# Patient Record
Sex: FEMALE | Race: WHITE | HISPANIC OR LATINO | Employment: FULL TIME | URBAN - NONMETROPOLITAN AREA
[De-identification: names, ages, dates, MRNs, and addresses within clinical notes are randomized per-mention and may not be internally consistent; named-entity substitution may affect disease eponyms.]

---

## 2017-02-22 ENCOUNTER — OFFICE VISIT (OUTPATIENT)
Dept: URGENT CARE | Facility: PHYSICIAN GROUP | Age: 37
End: 2017-02-22
Payer: COMMERCIAL

## 2017-02-22 VITALS
OXYGEN SATURATION: 98 % | DIASTOLIC BLOOD PRESSURE: 86 MMHG | SYSTOLIC BLOOD PRESSURE: 142 MMHG | TEMPERATURE: 97.1 F | HEART RATE: 100 BPM | RESPIRATION RATE: 16 BRPM

## 2017-02-22 DIAGNOSIS — S80.01XA CONTUSION OF RIGHT KNEE, INITIAL ENCOUNTER: ICD-10-CM

## 2017-02-22 DIAGNOSIS — S80.02XA CONTUSION OF LEFT KNEE, INITIAL ENCOUNTER: ICD-10-CM
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
## 2017-02-22 PROCEDURE — 99203 OFFICE O/P NEW LOW 30 MIN: CPT | Performed by: PHYSICIAN ASSISTANT

## 2017-02-22 RX ORDER — IBUPROFEN 600 MG/1
600 TABLET ORAL EVERY 6 HOURS PRN
Qty: 30 TAB | Refills: 0 | Status: ON HOLD | OUTPATIENT
Start: 2017-02-22 | End: 2017-12-10

## 2017-02-22 RX ORDER — CYCLOBENZAPRINE HCL 10 MG
10 TABLET ORAL 3 TIMES DAILY PRN
Qty: 30 TAB | Refills: 0 | Status: ON HOLD | OUTPATIENT
Start: 2017-02-22 | End: 2017-12-10
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
## 2017-02-22 NOTE — Clinical Note
February 22, 2017         Patient: Bernice Billings   YOB: 1980   Date of Visit: 2/22/2017           To Whom it May Concern:    Bernice Billings was seen in my clinic on 2/22/2017. She may return to work on 2/24/17.    If you have any questions or concerns, please don't hesitate to call.        Sincerely,           Noelle Hazel PA-C  Electronically Signed

## 2017-11-03 ENCOUNTER — NON-PROVIDER VISIT (OUTPATIENT)
Dept: HEALTH INFORMATION MANAGEMENT | Facility: MEDICAL CENTER | Age: 37
End: 2017-11-03
Payer: COMMERCIAL

## 2017-11-03 VITALS — WEIGHT: 176.8 LBS | HEIGHT: 62 IN | BODY MASS INDEX: 32.54 KG/M2

## 2017-11-03 DIAGNOSIS — O24.419 GESTATIONAL DIABETES MELLITUS (GDM) IN THIRD TRIMESTER, GESTATIONAL DIABETES METHOD OF CONTROL UNSPECIFIED: ICD-10-CM

## 2017-11-03 PROCEDURE — G0109 DIAB MANAGE TRN IND/GROUP: HCPCS | Performed by: INTERNAL MEDICINE

## 2017-11-03 NOTE — LETTER
November 3, 2017                   Re: Berniec Billings    1980         [id number]       Nate Farrell M.D.  [address]  [place] [address]      Dear :Nate Farrell M.D.    On 11/3/2017, your patient Bernice Billings, received 2 hours of nutrition training from the Diabetes Center at [facility] for management of her gestational diabetes.  Her EDC is Estimated Date of Delivery:  12/24/17.  We taught the following subjects:    Patient provided 1700 calorie meal plan with 3 meals and 3 snacks.   157 grams carbohydrate,   113 grams protein,   70 grams fat  Importance of meal planning in diabetes management during pregnancy  Importance of consistent timing of meals and snacks and agreed upon times  Avoidance of simple carbohydrates  Metabolism of food components relating to pregnancy  Identification of foods in food groups  Patient demonstrates adequate ability to utilize meal planning manual for reference  Plan 3 meals and 3 snacks with 90% accuracy  Review basic principles of eating out  Reviewed precautions with artificial sweeteners  Comments:  Bernice agreed to follow the meal plan and to eat at the times agreed upon.  She will check blood glucose 4 times a day and record the values in her log book.  She will follow up in your office.    Hopefully this will help in your management of her care.  If we can be of further assistance, please feel free to call.    Thank you for the referral.    Sincerely,  Alyx Yañez RD, CDE  Certified Diabetes Educator

## 2017-11-03 NOTE — PROGRESS NOTES
Bernice came to the Gestational Diabetes program.  She states her father has Type 2 Diabetes.  She states did not have Gestational Diabetes with her other pregnancy.  She was supplied an One Touch Verio Flex meter and 20 test strips.  She was able to do a return demonstration without difficulty. She will keep walking and stay well hydrated with water. Her meal plan is scanned into Media and her Doctor Letters with what was taught can be found under the Letters tab.

## 2017-11-03 NOTE — LETTER
November 3, 2017                   Re: Bernice Billings     1980         [id number]       Nate Farrell M.D.  [address]  [place] [address]      Dear :Nate Farrell M.D.    On 11/3/2017, your patient Bernice Billings, received 1 hour of nurse training from the Diabetes Center at [facility] for management of her gestational diabetes.  Her Estimated Date of Delivery:  12/24/17.  We taught the following subjects:    Introduction to gestational diabetes, benefits and responsibilities of patient, physiology of diabetes and the diease process, benefits of blood glucose monitoring and record keeping, medication action and possible side effects, hypoglycemia, sick day management, exercise, stress reduction and travel with diabetes.       Nurse assessment / Education:    Comments:    Edema:no      Weight:Weight: 80.2 kg (176 lb 12.8 oz)         Complaints:no      Pathophysiology of diabetes in pregnancy    Discuss  potential maternal and fetal complications in pregnancy with diabetes.     Importance of blood glucose monitoring   Proper testing technique using a One Touch Verio Flex meter.    At 1:40 pm, the meter read 105, which was 1 hour after eating.  Testing: fasting and one hour after meals,  expected ranges and rationale for strict control.     Ketone test today:no       Recognition and treatment of hypoglycemia.     Insulin taught: No  Insulin briefly dicussed at this time.    Should patient require insulin later in pregnancy, she would need further education.     Reviewed fetal kick counts and other tests to determine fetal well-being  Discuss benefits and risks of exercise in pregnancy  Discuss when to call Doctor  Discuss sick day care  Importance of wearing diabetes identification      Patient/caregiver appeared to understand the content as demonstrated by appropriate questions.     Bernice Billings was encouraged to discuss this further with you.    Hopefully this will help in your  management of her care.  If we can be of further assistance, please feel free to call.    Thank you for the referral.    Sincerely,      Tami Balderas RN CDE  GDM CLASS  Certified Diabetes Educator

## 2017-11-23 ENCOUNTER — HOSPITAL ENCOUNTER (OUTPATIENT)
Facility: MEDICAL CENTER | Age: 37
End: 2017-11-23
Attending: OBSTETRICS & GYNECOLOGY | Admitting: OBSTETRICS & GYNECOLOGY
Payer: COMMERCIAL

## 2017-11-23 VITALS — TEMPERATURE: 96.8 F | DIASTOLIC BLOOD PRESSURE: 74 MMHG | SYSTOLIC BLOOD PRESSURE: 121 MMHG | RESPIRATION RATE: 18 BRPM

## 2017-11-23 PROCEDURE — 59025 FETAL NON-STRESS TEST: CPT | Performed by: OBSTETRICS & GYNECOLOGY

## 2017-11-23 NOTE — CONSULTS
DATE OF SERVICE:  2017    This is a 37-year-old  3, para 1, at 35 weeks and 1 day, who presents   to labor and delivery for NST secondary to gestational hypertension.  She has   no clinical signs or symptoms of preeclampsia.  Her blood pressures are   121/74.  Fetal surveillance shows baseline 130s with moderate long-term   variability, accels present, no variables, no decels.  Tocometer shows   occasional irritability and rare contraction.  She will be discharged to home   and follow up with routine care.       ____________________________________     MD MIQUEL KRUGER / MULUGETA    DD:  2017 12:07:37  DT:  2017 12:19:34    D#:  [id number]  Job#:  [id number]

## 2017-11-23 NOTE — PROGRESS NOTES
1045- 36 y/o, , EDC 17, EGA 35.4. Here to room 222 for scheduled NST.  EFM/toco applied, patient denies lof/bleeding, reports positive fm. VSS.     1140- Updates to Dr. Dorsey. MD reviewed FHT. Patient discharged with labor precautions. Educated patient on kick counts. Patient verbalized understanding and ambulated off unit.

## 2017-12-09 ENCOUNTER — HOSPITAL ENCOUNTER (INPATIENT)
Facility: MEDICAL CENTER | Age: 37
LOS: 1 days | End: 2017-12-11
Attending: OBSTETRICS & GYNECOLOGY | Admitting: OBSTETRICS & GYNECOLOGY
Payer: COMMERCIAL

## 2017-12-09 LAB
BASOPHILS # BLD AUTO: 0.4 % (ref 0–1.8)
BASOPHILS # BLD: 0.03 K/UL (ref 0–0.12)
EOSINOPHIL # BLD AUTO: 0.24 K/UL (ref 0–0.51)
EOSINOPHIL NFR BLD: 3.3 % (ref 0–6.9)
ERYTHROCYTE [DISTWIDTH] IN BLOOD BY AUTOMATED COUNT: 39.7 FL (ref 35.9–50)
HCT VFR BLD AUTO: 33.5 % (ref 37–47)
HGB BLD-MCNC: 11.5 G/DL (ref 12–16)
HOLDING TUBE BB 8507: NORMAL
IMM GRANULOCYTES # BLD AUTO: 0.02 K/UL (ref 0–0.11)
IMM GRANULOCYTES NFR BLD AUTO: 0.3 % (ref 0–0.9)
LYMPHOCYTES # BLD AUTO: 1.62 K/UL (ref 1–4.8)
LYMPHOCYTES NFR BLD: 22.4 % (ref 22–41)
MCH RBC QN AUTO: 29.5 PG (ref 27–33)
MCHC RBC AUTO-ENTMCNC: 34.3 G/DL (ref 33.6–35)
MCV RBC AUTO: 85.9 FL (ref 81.4–97.8)
MONOCYTES # BLD AUTO: 0.4 K/UL (ref 0–0.85)
MONOCYTES NFR BLD AUTO: 5.5 % (ref 0–13.4)
NEUTROPHILS # BLD AUTO: 4.91 K/UL (ref 2–7.15)
NEUTROPHILS NFR BLD: 68.1 % (ref 44–72)
NRBC # BLD AUTO: 0 K/UL
NRBC BLD AUTO-RTO: 0 /100 WBC
PLATELET # BLD AUTO: 296 K/UL (ref 164–446)
PMV BLD AUTO: 10.6 FL (ref 9–12.9)
RBC # BLD AUTO: 3.9 M/UL (ref 4.2–5.4)
WBC # BLD AUTO: 7.2 K/UL (ref 4.8–10.8)

## 2017-12-09 PROCEDURE — 700105 HCHG RX REV CODE 258

## 2017-12-09 PROCEDURE — 4A1HX4Z MONITORING OF PRODUCTS OF CONCEPTION, CARDIAC ELECTRICAL ACTIVITY, EXTERNAL APPROACH: ICD-10-PCS | Performed by: OBSTETRICS & GYNECOLOGY

## 2017-12-09 PROCEDURE — 85025 COMPLETE CBC W/AUTO DIFF WBC: CPT

## 2017-12-09 RX ORDER — SODIUM CHLORIDE, SODIUM LACTATE, POTASSIUM CHLORIDE, CALCIUM CHLORIDE 600; 310; 30; 20 MG/100ML; MG/100ML; MG/100ML; MG/100ML
INJECTION, SOLUTION INTRAVENOUS
Status: COMPLETED
Start: 2017-12-09 | End: 2017-12-09

## 2017-12-09 RX ADMIN — SODIUM CHLORIDE, POTASSIUM CHLORIDE, SODIUM LACTATE AND CALCIUM CHLORIDE 1000 ML: 600; 310; 30; 20 INJECTION, SOLUTION INTRAVENOUS at 22:30

## 2017-12-09 ASSESSMENT — PAIN SCALES - GENERAL: PAINLEVEL_OUTOF10: 0

## 2017-12-10 LAB — GLUCOSE BLD-MCNC: 82 MG/DL (ref 65–99)

## 2017-12-10 PROCEDURE — 700111 HCHG RX REV CODE 636 W/ 250 OVERRIDE (IP)

## 2017-12-10 PROCEDURE — 303615 HCHG EPIDURAL/SPINAL ANESTHESIA FOR LABOR

## 2017-12-10 PROCEDURE — 3E033VJ INTRODUCTION OF OTHER HORMONE INTO PERIPHERAL VEIN, PERCUTANEOUS APPROACH: ICD-10-PCS | Performed by: OBSTETRICS & GYNECOLOGY

## 2017-12-10 PROCEDURE — 700112 HCHG RX REV CODE 229

## 2017-12-10 PROCEDURE — 700101 HCHG RX REV CODE 250

## 2017-12-10 PROCEDURE — 90471 IMMUNIZATION ADMIN: CPT

## 2017-12-10 PROCEDURE — 90715 TDAP VACCINE 7 YRS/> IM: CPT

## 2017-12-10 PROCEDURE — 700112 HCHG RX REV CODE 229: Performed by: OBSTETRICS & GYNECOLOGY

## 2017-12-10 PROCEDURE — 36415 COLL VENOUS BLD VENIPUNCTURE: CPT

## 2017-12-10 PROCEDURE — 10907ZC DRAINAGE OF AMNIOTIC FLUID, THERAPEUTIC FROM PRODUCTS OF CONCEPTION, VIA NATURAL OR ARTIFICIAL OPENING: ICD-10-PCS | Performed by: OBSTETRICS & GYNECOLOGY

## 2017-12-10 PROCEDURE — 700111 HCHG RX REV CODE 636 W/ 250 OVERRIDE (IP): Performed by: OBSTETRICS & GYNECOLOGY

## 2017-12-10 PROCEDURE — 700102 HCHG RX REV CODE 250 W/ 637 OVERRIDE(OP): Performed by: OBSTETRICS & GYNECOLOGY

## 2017-12-10 PROCEDURE — 304965 HCHG RECOVERY SERVICES

## 2017-12-10 PROCEDURE — A9270 NON-COVERED ITEM OR SERVICE: HCPCS | Performed by: OBSTETRICS & GYNECOLOGY

## 2017-12-10 PROCEDURE — 770002 HCHG ROOM/CARE - OB PRIVATE (112)

## 2017-12-10 PROCEDURE — 3E0234Z INTRODUCTION OF SERUM, TOXOID AND VACCINE INTO MUSCLE, PERCUTANEOUS APPROACH: ICD-10-PCS | Performed by: OBSTETRICS & GYNECOLOGY

## 2017-12-10 PROCEDURE — 59409 OBSTETRICAL CARE: CPT

## 2017-12-10 PROCEDURE — 82962 GLUCOSE BLOOD TEST: CPT

## 2017-12-10 PROCEDURE — 90732 PPSV23 VACC 2 YRS+ SUBQ/IM: CPT | Performed by: OBSTETRICS & GYNECOLOGY

## 2017-12-10 PROCEDURE — 700105 HCHG RX REV CODE 258: Performed by: OBSTETRICS & GYNECOLOGY

## 2017-12-10 RX ORDER — SODIUM CHLORIDE, SODIUM LACTATE, POTASSIUM CHLORIDE, CALCIUM CHLORIDE 600; 310; 30; 20 MG/100ML; MG/100ML; MG/100ML; MG/100ML
INJECTION, SOLUTION INTRAVENOUS CONTINUOUS
Status: DISPENSED | OUTPATIENT
Start: 2017-12-10 | End: 2017-12-10

## 2017-12-10 RX ORDER — ALUMINA, MAGNESIA, AND SIMETHICONE 2400; 2400; 240 MG/30ML; MG/30ML; MG/30ML
30 SUSPENSION ORAL EVERY 6 HOURS PRN
Status: DISCONTINUED | OUTPATIENT
Start: 2017-12-10 | End: 2017-12-10 | Stop reason: HOSPADM

## 2017-12-10 RX ORDER — MISOPROSTOL 200 UG/1
600 TABLET ORAL
Status: DISCONTINUED | OUTPATIENT
Start: 2017-12-10 | End: 2017-12-11 | Stop reason: HOSPADM

## 2017-12-10 RX ORDER — LORATADINE 10 MG/1
10 TABLET ORAL DAILY
COMMUNITY

## 2017-12-10 RX ORDER — ROPIVACAINE HYDROCHLORIDE 2 MG/ML
INJECTION, SOLUTION EPIDURAL; INFILTRATION; PERINEURAL
Status: COMPLETED
Start: 2017-12-10 | End: 2017-12-10

## 2017-12-10 RX ORDER — VITAMIN A ACETATE, BETA CAROTENE, ASCORBIC ACID, CHOLECALCIFEROL, .ALPHA.-TOCOPHEROL ACETATE, DL-, THIAMINE MONONITRATE, RIBOFLAVIN, NIACINAMIDE, PYRIDOXINE HYDROCHLORIDE, FOLIC ACID, CYANOCOBALAMIN, CALCIUM CARBONATE, FERROUS FUMARATE, ZINC OXIDE, CUPRIC OXIDE 3080; 12; 120; 400; 1; 1.84; 3; 20; 22; 920; 25; 200; 27; 10; 2 [IU]/1; UG/1; MG/1; [IU]/1; MG/1; MG/1; MG/1; MG/1; MG/1; [IU]/1; MG/1; MG/1; MG/1; MG/1; MG/1
1 TABLET, FILM COATED ORAL EVERY MORNING
Status: DISCONTINUED | OUTPATIENT
Start: 2017-12-10 | End: 2017-12-11 | Stop reason: HOSPADM

## 2017-12-10 RX ORDER — ONDANSETRON 4 MG/1
4 TABLET, ORALLY DISINTEGRATING ORAL EVERY 6 HOURS PRN
Status: DISCONTINUED | OUTPATIENT
Start: 2017-12-10 | End: 2017-12-11 | Stop reason: HOSPADM

## 2017-12-10 RX ORDER — IBUPROFEN 600 MG/1
600 TABLET ORAL EVERY 6 HOURS PRN
Status: DISCONTINUED | OUTPATIENT
Start: 2017-12-10 | End: 2017-12-11 | Stop reason: HOSPADM

## 2017-12-10 RX ORDER — DEXTROSE, SODIUM CHLORIDE, SODIUM LACTATE, POTASSIUM CHLORIDE, AND CALCIUM CHLORIDE 5; .6; .31; .03; .02 G/100ML; G/100ML; G/100ML; G/100ML; G/100ML
INJECTION, SOLUTION INTRAVENOUS CONTINUOUS
Status: DISCONTINUED | OUTPATIENT
Start: 2017-12-10 | End: 2017-12-10 | Stop reason: HOSPADM

## 2017-12-10 RX ORDER — CITRIC ACID/SODIUM CITRATE 334-500MG
30 SOLUTION, ORAL ORAL EVERY 6 HOURS PRN
Status: DISCONTINUED | OUTPATIENT
Start: 2017-12-10 | End: 2017-12-10 | Stop reason: HOSPADM

## 2017-12-10 RX ORDER — SODIUM CHLORIDE, SODIUM LACTATE, POTASSIUM CHLORIDE, CALCIUM CHLORIDE 600; 310; 30; 20 MG/100ML; MG/100ML; MG/100ML; MG/100ML
INJECTION, SOLUTION INTRAVENOUS PRN
Status: DISCONTINUED | OUTPATIENT
Start: 2017-12-10 | End: 2017-12-11 | Stop reason: HOSPADM

## 2017-12-10 RX ORDER — OXYCODONE AND ACETAMINOPHEN 10; 325 MG/1; MG/1
1 TABLET ORAL EVERY 4 HOURS PRN
Status: DISCONTINUED | OUTPATIENT
Start: 2017-12-10 | End: 2017-12-11 | Stop reason: HOSPADM

## 2017-12-10 RX ORDER — ONDANSETRON 2 MG/ML
4 INJECTION INTRAMUSCULAR; INTRAVENOUS EVERY 6 HOURS PRN
Status: DISCONTINUED | OUTPATIENT
Start: 2017-12-10 | End: 2017-12-11 | Stop reason: HOSPADM

## 2017-12-10 RX ORDER — DOCUSATE SODIUM 100 MG/1
100 CAPSULE, LIQUID FILLED ORAL 2 TIMES DAILY PRN
Status: DISCONTINUED | OUTPATIENT
Start: 2017-12-10 | End: 2017-12-11 | Stop reason: HOSPADM

## 2017-12-10 RX ORDER — MISOPROSTOL 200 UG/1
600 TABLET ORAL
Status: DISCONTINUED | OUTPATIENT
Start: 2017-12-10 | End: 2017-12-10 | Stop reason: HOSPADM

## 2017-12-10 RX ORDER — OXYCODONE HYDROCHLORIDE AND ACETAMINOPHEN 5; 325 MG/1; MG/1
1 TABLET ORAL EVERY 4 HOURS PRN
Status: DISCONTINUED | OUTPATIENT
Start: 2017-12-10 | End: 2017-12-11 | Stop reason: HOSPADM

## 2017-12-10 RX ADMIN — SODIUM CHLORIDE, POTASSIUM CHLORIDE, SODIUM LACTATE AND CALCIUM CHLORIDE: 600; 310; 30; 20 INJECTION, SOLUTION INTRAVENOUS at 08:45

## 2017-12-10 RX ADMIN — PNEUMOCOCCAL VACCINE POLYVALENT 25 MCG
25; 25; 25; 25; 25; 25; 25; 25; 25; 25; 25; 25; 25; 25; 25; 25; 25; 25; 25; 25; 25; 25; 25 INJECTION, SOLUTION INTRAMUSCULAR; SUBCUTANEOUS at 22:07

## 2017-12-10 RX ADMIN — FENTANYL CITRATE 100 MCG: 50 INJECTION, SOLUTION INTRAMUSCULAR; INTRAVENOUS at 12:51

## 2017-12-10 RX ADMIN — Medication 125 ML/HR: at 17:11

## 2017-12-10 RX ADMIN — IBUPROFEN 600 MG: 600 TABLET, FILM COATED ORAL at 19:28

## 2017-12-10 RX ADMIN — ROPIVACAINE HYDROCHLORIDE 100 ML: 2 INJECTION, SOLUTION EPIDURAL; INFILTRATION at 13:23

## 2017-12-10 RX ADMIN — Medication 1 MILLI-UNITS/MIN: at 08:11

## 2017-12-10 RX ADMIN — DOCUSATE SODIUM 100 MG: 100 CAPSULE ORAL at 19:28

## 2017-12-10 RX ADMIN — FENTANYL CITRATE 100 MCG: 50 INJECTION, SOLUTION INTRAMUSCULAR; INTRAVENOUS at 00:46

## 2017-12-10 RX ADMIN — TETANUS TOXOID, REDUCED DIPHTHERIA TOXOID AND ACELLULAR PERTUSSIS VACCINE, ADSORBED 0.5 ML: 5; 2.5; 8; 8; 2.5 SUSPENSION INTRAMUSCULAR at 22:06

## 2017-12-10 RX ADMIN — OXYCODONE AND ACETAMINOPHEN 1 TABLET: 5; 325 TABLET ORAL at 19:28

## 2017-12-10 RX ADMIN — SODIUM CHLORIDE, POTASSIUM CHLORIDE, SODIUM LACTATE AND CALCIUM CHLORIDE: 600; 310; 30; 20 INJECTION, SOLUTION INTRAVENOUS at 12:41

## 2017-12-10 ASSESSMENT — LIFESTYLE VARIABLES
DO YOU DRINK ALCOHOL: NO
DO YOU DRINK ALCOHOL: NO
EVER_SMOKED: YES
ALCOHOL_USE: NO

## 2017-12-10 ASSESSMENT — PAIN SCALES - GENERAL
PAINLEVEL_OUTOF10: 5
PAINLEVEL_OUTOF10: 0
PAINLEVEL_OUTOF10: 1

## 2017-12-10 NOTE — PROGRESS NOTES
0700- Bedside report received from Desirae RICARDO- poc discussed   07- Dr. Farrell in room, balloon removed sve 4-5/60/-2, ok for pt to eat food this am  0811- pitocin started  1000- pt comfortable feeling uc's states they are not uncomfortable at this time will let me know if she needs anything for pain  1219- Dr. Farrell at bedside AROM, sve 5/80/-2  1312- pt sitting for epidural  1320- epidural placed  1415- pt feeling pressure, sve 7-8/90/-1  1445- pt feeling pressure sve complete  1509-  viable female infant apgars 8/8  1700- pt up to the bathroom no problems  1730- Report given to [place] RICARDO- poc discussed

## 2017-12-10 NOTE — PROGRESS NOTES
Pt presents to L&D for scheduled induction for chronic HTN. Pt has GDM (diet controlled) and a hx of asthma.     2255-Cook's catheter balloon placed by Dr Farrell. 60 mL of sterile water placed in uterine balloon, no fluid in vaginal balloon.   0700-report to Ruth Ann RICARDO.

## 2017-12-10 NOTE — CARE PLAN
Problem: Pain  Goal: Alleviation of Pain or a reduction in pain to the patient's comfort goal  Outcome: PROGRESSING AS EXPECTED  Pt feels occasional pain and discomfort with contractions. Pt's pain has been assessed frequently.     Problem: Risk for Infection, Impaired Wound Healing  Goal: Remain free from signs and symptoms of infection  Outcome: PROGRESSING AS EXPECTED  Pt free from S/S of infection.

## 2017-12-10 NOTE — CARE PLAN
Problem: Infection  Goal: Will remain free from infection    Intervention: Assess signs and symptoms of infection  No s/s of infection at this time      Problem: Pain  Goal: Patient will have relaxed facial expressions and be able to rest between uterine contractions    Intervention: Assess for nonverbal signs of ineffective coping with pain and offer pain medications and/or epidural anesthesia  Pt is comfortable at this time, will let me know if she needs pain meds

## 2017-12-11 VITALS
TEMPERATURE: 98.4 F | HEART RATE: 69 BPM | DIASTOLIC BLOOD PRESSURE: 81 MMHG | HEIGHT: 62 IN | OXYGEN SATURATION: 96 % | SYSTOLIC BLOOD PRESSURE: 118 MMHG | WEIGHT: 170 LBS | RESPIRATION RATE: 18 BRPM | BODY MASS INDEX: 31.28 KG/M2

## 2017-12-11 LAB
ERYTHROCYTE [DISTWIDTH] IN BLOOD BY AUTOMATED COUNT: 39.5 FL (ref 35.9–50)
HCT VFR BLD AUTO: 33.2 % (ref 37–47)
HGB BLD-MCNC: 11.2 G/DL (ref 12–16)
MCH RBC QN AUTO: 28.8 PG (ref 27–33)
MCHC RBC AUTO-ENTMCNC: 33.7 G/DL (ref 33.6–35)
MCV RBC AUTO: 85.3 FL (ref 81.4–97.8)
PLATELET # BLD AUTO: 249 K/UL (ref 164–446)
PMV BLD AUTO: 10.8 FL (ref 9–12.9)
RBC # BLD AUTO: 3.89 M/UL (ref 4.2–5.4)
WBC # BLD AUTO: 9.7 K/UL (ref 4.8–10.8)

## 2017-12-11 PROCEDURE — 36415 COLL VENOUS BLD VENIPUNCTURE: CPT

## 2017-12-11 PROCEDURE — A9270 NON-COVERED ITEM OR SERVICE: HCPCS | Performed by: OBSTETRICS & GYNECOLOGY

## 2017-12-11 PROCEDURE — 85027 COMPLETE CBC AUTOMATED: CPT

## 2017-12-11 PROCEDURE — 700102 HCHG RX REV CODE 250 W/ 637 OVERRIDE(OP): Performed by: OBSTETRICS & GYNECOLOGY

## 2017-12-11 RX ORDER — OXYCODONE HYDROCHLORIDE AND ACETAMINOPHEN 5; 325 MG/1; MG/1
1-2 TABLET ORAL EVERY 6 HOURS PRN
Qty: 10 TAB | Refills: 0 | Status: SHIPPED | OUTPATIENT
Start: 2017-12-11 | End: 2018-02-02

## 2017-12-11 RX ORDER — IBUPROFEN 600 MG/1
600 TABLET ORAL EVERY 6 HOURS PRN
Qty: 30 TAB | Refills: 0 | Status: SHIPPED | OUTPATIENT
Start: 2017-12-11 | End: 2018-02-02

## 2017-12-11 RX ADMIN — IBUPROFEN 600 MG: 600 TABLET, FILM COATED ORAL at 01:54

## 2017-12-11 RX ADMIN — OXYCODONE AND ACETAMINOPHEN 1 TABLET: 5; 325 TABLET ORAL at 01:54

## 2017-12-11 RX ADMIN — OXYCODONE HYDROCHLORIDE AND ACETAMINOPHEN 1 TABLET: 10; 325 TABLET ORAL at 11:18

## 2017-12-11 RX ADMIN — Medication 1 TABLET: at 07:57

## 2017-12-11 ASSESSMENT — PAIN SCALES - GENERAL
PAINLEVEL_OUTOF10: 0
PAINLEVEL_OUTOF10: 3
PAINLEVEL_OUTOF10: 5
PAINLEVEL_OUTOF10: 1
PAINLEVEL_OUTOF10: 7

## 2017-12-11 NOTE — CARE PLAN
Problem: Alteration in comfort related to episiotomy, vaginal repair and/or after birth pains  Goal: Patient is able to ambulate, care for self and infant  Patient ambulating independently.  Is able to care for self and infant.  Demonstrates effective bonding with infant.  Intervention: Obtain patient's pain goal  Patient denies pain this morning.  Intervention: Assess 0-10 pain level with vital signs  Patient's pain level this morning is 0.  Intervention: Administer pain meds as requested by patient and ordered by MD/DO/CNM  Pain medications administered per MAR and MD order.      Problem: Fluid Volume:  Goal: Will maintain balanced intake and output  Patient eating and drinking at least 75% of all meals.    Problem: Pain Management  Goal: Pain level will decrease to patient's comfort goal  PRN pain medications available.  Patient denies pain this morning.

## 2017-12-11 NOTE — PROGRESS NOTES
Received report from MIRELLA Cotto RN. Assessment complete. Pt states pain at acceptable level. Discussed pain management plan with pt. Pt will call for PRN pain meds. Pt educated on the dangers of sleeping with infant. Call light within reach. Pt encouraged to call with needs or concerns.

## 2017-12-11 NOTE — DELIVERY NOTE
DATE OF SERVICE:  12/10/2017    DELIVERY NOTE:  This is a patient of mine who was brought in last night for   induction at 38 weeks secondary to chronic hypertension.  She is also an A1   diabetic with good sugar control.  She was started with a Cook catheter   balloon, this took her from 2-3 cm to 4-5 cm.  It was removed in the morning.    Pitocin was started.  She had rupture of membranes with clear fluid.  She   received Pitocin.  She received an epidural.  She went on to deliver a viable   female infant by spontaneous vaginal delivery over sterile prep.  Apgars equal   to 8 and 8 at 1509 hours.  Only gentle traction was used in aid of delivery   of the infant.  There was nuchal cord x1 which was delivered through.    Placenta then delivered intact, 3-vessel cord shortly thereafter.  There were   no lacerations of the vagina or the cervix.  Estimated blood loss was 375 mL.    There were no complications.       ____________________________________     MD BELLA CORREA / MULUGETA    DD:  12/10/2017 15:23:58  DT:  12/10/2017 16:43:06    D#:  [id number]  Job#:  [id number]    cc: [facility]

## 2017-12-11 NOTE — CONSULTS
Spoke with mom to discuss breastfeeding progress. Mom states she has started pumping due to baby being 2+ weeks early, DC+, and small size. Mom states baby tires quickly at breast but her plan is to offer breast, supplement with formula, and pump each feeding.  Assistance offered at next breastfeeding attempt and prn.  Mom breast fed her first baby successfully.

## 2017-12-11 NOTE — PROGRESS NOTES
Patient arrived via wheelchair with belongings to [address]. Report received from [place] MIRELES RN. Patient oriented to room, call light/skylight & infant security. Assessment done fundus firm, lochia light, and vital signs within defined parameters. IV patent and infusing pitocin at 125 mL/Hr per MD order. Discussed with patient pain medication plan, patient requesting to be medicated as needed, will call for pain medication. Reviewed plan of care with patient, encouraged to call with needs. Hourly rounding implemented, call light within reach.

## 2017-12-11 NOTE — PROGRESS NOTES
S:  No c/o, decreased vaginal bleeding, no s/s infection  O:  VSS, AF  PE:  Gen:  NAD.  Abd: soft, NT/ND, no peritoneal signs, FF and NT.  Ext=  No s/s DVT  A:1.  S/p    P:  Pt wants d/c.  D/c with precautions.  F/u ER with temp>100 degrees, severe pain, nausea/vomiting, vaginal bleeding greater than a period, syncope or dizziness, any concerns.  No driving on narcotic pain meds, pelvic rest for 6 weeks.  F/u appointment for post partum check in 6 weeks.

## 2017-12-11 NOTE — PROGRESS NOTES
Received bedside report from night shift RN. Assumed care of patient. Pt assessed and stable. VSS. Patient reports 0/10 pain at this time. Discussed plan of care for day with patient and received verbal understanding. Call light within reach, bed in low position.  Educated pt re fall precautions and received verbal understanding.  However, pt continues to refuse bed alarms.  Pt is alert, oriented, steady on feet and calls appropriately. Patient sitting up in bed eating breakfast.  Infant in open crib next at foot of mother's bed.  Family at bedside.

## 2017-12-11 NOTE — DISCHARGE INSTRUCTIONS
POSTPARTUM DISCHARGE INSTRUCTIONS FOR MOM    YOB: 1980   Age: 37 y.o.               Admit Date: 2017     Discharge Date: 2017  Attending Doctor:  Nate Farrell M.D.                  Allergies:  Patient has no known allergies.    Discharged to home by car. Discharged via walking, hospital escort: Yes.  Special equipment needed: Not Applicable  Belongings with: Personal  Be sure to schedule a follow-up appointment with your primary care doctor or any specialists as instructed.     Discharge Plan: Follow up in the emergency room with temperature greater than 100 degrees, severe pain, nausea/vomiting, vaginal bleeding greater than a period, syncope or dizziness, signs or symptoms of pre-eclampsia, any concerns.  No driving on narcotic pain meds, pelvic rest for 6 weeks.  Follow up appointment for post partum check in 6 weeks.  Smoking Cessation Offered: Patient Counseled  Pneumococcal Vaccine Given - only chart on this line when given: Given (See MAR)  Influenza Vaccine Indication: Not indicated: Previously immunized this influenza season and > 8 years of age    REASONS TO CALL YOUR OBSTETRICIAN:  1.   Persistent fever or shaking chills (Temperature higher than 100.4)  2.   Heavy bleeding (soaking more than 1 pad per hour); Passing clots  3.   Foul odor from vagina  4.   Mastitis (Breast infection; breast pain, chills, fever, redness)  5.   Urinary pain, burning or frequency  6.   Episiotomy infection  7.   Abdominal incision infection  8.   Severe depression longer than 24 hours    HAND WASHING  · Prior to handling the baby.  · Before breastfeeding or bottle feeding baby.  · After using the bathroom or changing the baby's diaper.    WOUND CARE  Ask your physician for additional care instructions.  In general:    ·  Incision:      · Keep clean and dry.    · Do NOT lift anything heavier than your baby for up to 6 weeks.    · There should not be any opening or pus.      VAGINAL  "CARE  · Nothing inside vagina for 6 weeks: no sexual intercourse, tampons or douching.  · Bleeding may continue for 2-4 weeks.  Amount may vary.    · Call your physician for heavy bleeding which means soaking more than 1 pad per hour    BIRTH CONTROL  · It is possible to become pregnant at any time after delivery and while breastfeeding.  · Plan to discuss a method of birth control with your physician at your follow up visit. visit.    DIET AND ELIMINATION  · Eating more fiber (bran cereal, fruits, and vegetables) and drinking plenty of fluids will help to avoid constipation.  · Urinary frequency after childbirth is normal.    POSTPARTUM BLUES  During the first few days after birth, you may experience a sense of the \"blues\" which may include impatience, irritability or even crying.  These feeling come and go quickly.  However, as many as 1 in 10 women experience emotional symptoms known as postpartum depression.    Postpartum depression:  May start as early as the second or third day after delivery or take several weeks or months to develop.  Symptoms of \"blues\" are present, but are more intense:  Crying spells; loss of appetite; feelings of hopelessness or loss of control; fear of touching the baby; over concern or no concern at all about the baby; little or no concern about your own appearance/caring for yourself; and/or inability to sleep or excessive sleeping.  Contact your physician if you are experiencing any of these symptoms.    Crisis Hotline:  · [place] Crisis Hotline:  6-135-WJPMLEE  Or 1-944.410.4154  · [place] Crisis Hotline:  1-811.170.7136  Or 112-559-0039    PREVENTING SHAKEN BABY:  If you are angry or stressed, PUT THE BABY IN THE CRIB, step away, take some deep breaths, and wait until you are calm to care for the baby.  DO NOT SHAKE THE BABY.  You are not alone, call a supporter for help.    · Crisis Call Center 24/7 crisis line 749-115-8284 or 1-344.860.3494  · You can also text them, text " "\"ANSWER\" to 532671    QUIT SMOKING/TOBACCO USE:  I understand the use of any tobacco products increases my chance of suffering from future heart disease and could cause other illnesses which may shorten my life. Quitting the use of tobacco products is the single most important thing I can do to improve my health. For further information on smoking / tobacco cessation call a Toll Free Quit Line at 1-782.580.2696 (*[employer] [place]) or 1-943.137.6131 (American Lung Association) or you can access the web based program at www.lungusa.org.    · [place] Tobacco Users Help Line:  (296) 724-7080       Toll Free: 1-689.388.4239  · Quit Tobacco Program [facility] (841)244-0415    DEPRESSION / SUICIDE RISK:  As you are discharged from this [facility], it is important to learn how to keep safe from harming yourself.    Recognize the warning signs:  · Abrupt changes in personality, positive or negative- including increase in energy   · Giving away possessions  · Change in eating patterns- significant weight changes-  positive or negative  · Change in sleeping patterns- unable to sleep or sleeping all the time   · Unwillingness or inability to communicate  · Depression  · Unusual sadness, discouragement and loneliness  · Talk of wanting to die  · Neglect of personal appearance   · Rebelliousness- reckless behavior  · Withdrawal from people/activities they love  · Confusion- inability to concentrate     If you or a loved one observes any of these behaviors or has concerns about self-harm, here's what you can do:  · Talk about it- your feelings and reasons for harming yourself  · Remove any means that you might use to hurt yourself (examples: pills, rope, extension cords, firearm)  · Get professional help from the community (Mental Health, Substance Abuse, psychological counseling)  · Do not be alone:Call your Safe Contact- someone whom you trust who will be there for you.  · Call your " local CRISIS HOTLINE 294-9585 or 816-871-0411  · Call your local Children's Mobile Crisis Response Team [place] (336) 519-9359 or www.[employer]  · Call the toll free National Suicide Prevention Hotlines   · National Suicide Prevention Lifeline 678-087-VXTT (0038)  · National Hope Line Network 800-SUICIDE (630-3225)    DISCHARGE SURVEY:  Thank you for choosing [facility].  We hope we provided you with very good care.  You may be receiving a survey in the mail.  Please fill it out.  Your opinion is valuable to us.    ADDITIONAL EDUCATIONAL MATERIALS GIVEN TO PATIENT:  Postpartum education materials.        My signature on this form indicates that:  1.  I have reviewed and understand the above information  2.  My questions regarding this information have been answered to my satisfaction.  3.  I have formulated a plan with my discharge nurse to obtain my prescribed medication for home.

## 2017-12-11 NOTE — PROGRESS NOTES
1730- Report received from NEYMAR Hernandez RN. Epidural catheter removed with tip intact.     1800- Patient up to bathroom and able to void large amount. Patient transferred to PP unit in stable condition and bedside report to PP DAVION Monroe. POC discussed.

## 2017-12-12 NOTE — PROGRESS NOTES
Prescriptions discharge instructions and education for mom and baby given to patient. Follow up appointments discussed.

## 2018-02-02 DIAGNOSIS — Z01.812 PRE-OPERATIVE LABORATORY EXAMINATION: ICD-10-CM

## 2018-02-02 DIAGNOSIS — Z01.810 PRE-OPERATIVE CARDIOVASCULAR EXAMINATION: ICD-10-CM

## 2018-02-02 LAB
APPEARANCE UR: CLEAR
BASOPHILS # BLD AUTO: 0.7 % (ref 0–1.8)
BASOPHILS # BLD: 0.06 K/UL (ref 0–0.12)
BILIRUB UR QL STRIP.AUTO: NEGATIVE
COLOR UR: YELLOW
CULTURE IF INDICATED INDCX: NO UA CULTURE
EOSINOPHIL # BLD AUTO: 0.28 K/UL (ref 0–0.51)
EOSINOPHIL NFR BLD: 3.4 % (ref 0–6.9)
ERYTHROCYTE [DISTWIDTH] IN BLOOD BY AUTOMATED COUNT: 42.9 FL (ref 35.9–50)
GLUCOSE UR STRIP.AUTO-MCNC: NEGATIVE MG/DL
HCG UR QL: NEGATIVE
HCT VFR BLD AUTO: 36.5 % (ref 37–47)
HGB BLD-MCNC: 11.9 G/DL (ref 12–16)
IMM GRANULOCYTES # BLD AUTO: 0.05 K/UL (ref 0–0.11)
IMM GRANULOCYTES NFR BLD AUTO: 0.6 % (ref 0–0.9)
KETONES UR STRIP.AUTO-MCNC: NEGATIVE MG/DL
LEUKOCYTE ESTERASE UR QL STRIP.AUTO: NEGATIVE
LYMPHOCYTES # BLD AUTO: 1.61 K/UL (ref 1–4.8)
LYMPHOCYTES NFR BLD: 19.8 % (ref 22–41)
MCH RBC QN AUTO: 27.8 PG (ref 27–33)
MCHC RBC AUTO-ENTMCNC: 32.6 G/DL (ref 33.6–35)
MCV RBC AUTO: 85.3 FL (ref 81.4–97.8)
MICRO URNS: NORMAL
MONOCYTES # BLD AUTO: 0.36 K/UL (ref 0–0.85)
MONOCYTES NFR BLD AUTO: 4.4 % (ref 0–13.4)
NEUTROPHILS # BLD AUTO: 5.77 K/UL (ref 2–7.15)
NEUTROPHILS NFR BLD: 71.1 % (ref 44–72)
NITRITE UR QL STRIP.AUTO: NEGATIVE
NRBC # BLD AUTO: 0 K/UL
NRBC BLD-RTO: 0 /100 WBC
PH UR STRIP.AUTO: 6 [PH]
PLATELET # BLD AUTO: 406 K/UL (ref 164–446)
PMV BLD AUTO: 9.8 FL (ref 9–12.9)
PROT UR QL STRIP: NEGATIVE MG/DL
RBC # BLD AUTO: 4.28 M/UL (ref 4.2–5.4)
RBC UR QL AUTO: NEGATIVE
SP GR UR REFRACTOMETRY: 1.01
SP GR UR STRIP.AUTO: 1.01
UROBILINOGEN UR STRIP.AUTO-MCNC: 0.2 MG/DL
WBC # BLD AUTO: 8.1 K/UL (ref 4.8–10.8)

## 2018-02-02 PROCEDURE — 36415 COLL VENOUS BLD VENIPUNCTURE: CPT

## 2018-02-02 PROCEDURE — 81003 URINALYSIS AUTO W/O SCOPE: CPT

## 2018-02-02 PROCEDURE — 84703 CHORIONIC GONADOTROPIN ASSAY: CPT

## 2018-02-02 PROCEDURE — 85025 COMPLETE CBC W/AUTO DIFF WBC: CPT

## 2018-02-02 PROCEDURE — 81025 URINE PREGNANCY TEST: CPT

## 2018-02-07 RX ORDER — CELECOXIB 200 MG/1
400 CAPSULE ORAL
Status: DISCONTINUED | OUTPATIENT
Start: 2018-02-08 | End: 2018-02-08 | Stop reason: HOSPADM

## 2018-02-07 RX ORDER — ACETAMINOPHEN 500 MG
1000 TABLET ORAL
Status: DISCONTINUED | OUTPATIENT
Start: 2018-02-08 | End: 2018-02-08 | Stop reason: HOSPADM

## 2018-02-07 RX ORDER — GABAPENTIN 300 MG/1
300 CAPSULE ORAL
Status: DISCONTINUED | OUTPATIENT
Start: 2018-02-08 | End: 2018-02-08 | Stop reason: HOSPADM

## 2018-02-07 NOTE — H&P
She is scheduled for a laparoscopic bilateral salpingectomy tomorrow at   [facility].    CHIEF COMPLAINT:  Desires permanent sterilization.    HISTORY OF PRESENT ILLNESS:  Patient is a 37-year-old,  3, para 2,   female who is status post second vaginal delivery by Dr. Farrell about 7 weeks   ago.  She saw him for a postpartum visit and wanted to proceed with a   sterilization procedure before she returns to work.  She also has had genuine   stress incontinence for over 10 years and was noted to have a rectocele and   cystocele on her postpartum exam, but the recommendation is to defer her TOT   and A and P repair at least a year after childbirth.  Patient is agreeable to   this plan and plan is to proceed only with a laparoscopic bilateral   salpingectomy for sterilization.    PAST MEDICAL HISTORY:  Significant for asthma, gestational diabetes,   hypertension.    PAST SURGICAL HISTORY:  Otherwise negative.    OBSTETRICAL HISTORY:  She has had 2 term vaginal deliveries without   complication.    CURRENT MEDICATIONS:  She has been on labetalol in pregnancy; Pro-Air,   Pulmicort, Singulair for her asthma.    ALLERGIES:  She has no known drug allergies, mostly seasonal.    FAMILY HISTORY:  Significant for diabetes, heart disease, stroke,   hyperlipidemia, hypertension, ovarian cancer possibly.    OBSTETRICAL HISTORY:  She has had 2 term vaginal deliveries without   complication.  Her last Pap was within the last year, within normal limits.    SOCIAL HISTORY:  She is .  Denies any alcohol, tobacco, or IV drug use.    No illicit drug use.    PHYSICAL EXAMINATION:  VITAL SIGNS:  Stable.  She is 5 feet 2 inches, 159 pounds.  GENERAL:  Alert and oriented, in no acute distress.  LUNGS:  Clear.  CORONARY:  Regular rhythm and rate.  ABDOMEN:  Soft, nondistended, nontender without mass or organosplenomegaly.  GENITOURINARY:  External genitalia is normal.  She has a parous cervix.    Anterior and posterior defects  noted.  Minimal prolapse of the uterus and a   Q-tip test positive.    IMPRESSION:  Patient has mild pelvic relaxation and stress incontinence, which   we plan to perform Kegel exercise x4 weeks and wait until she is at least   about a year postpartum before making any surgical recommendation.  Patient   desires permanent sterilization.  She is multiparous and is advanced maternal   age.  She understands the risks of proceeding with laparoscopic bilateral   salpingectomy, which include but are not limited to infection, bleeding,   transfusion, transfusion-related complications, risk of pregnancy, although   remote, risk for taking the entire tube bilaterally, and increased risk of an   ectopic if indeed she were to conceive, although that would not be a tubal   ectopic but an abdominal ectopic or otherwise, risk of injury to bowel,   bladder, need for repair with laparoscopic procedure.  She was also given the   option for Essure coil placement and declined that option.  She understands   the risks of general anesthesia including death.  We discussed nonnarcotic   options as well as her other options for nonpermanent contraception including   LARC method and she declines.  A  check will be performed as well as she   will be given Percocet and fill out a narcotic prescription form as well as a   risk assessment form prior to discharge.  All her questions were answered and   informed consent was obtained.       ____________________________________     MD DIMAS REYES / MULUGETA    DD:  02/07/2018 11:41:38  DT:  02/07/2018 12:29:40    D#:  [id number]  Job#:  [id number]

## 2018-02-08 ENCOUNTER — HOSPITAL ENCOUNTER (OUTPATIENT)
Facility: MEDICAL CENTER | Age: 38
End: 2018-02-08
Attending: OBSTETRICS & GYNECOLOGY | Admitting: OBSTETRICS & GYNECOLOGY
Payer: COMMERCIAL

## 2018-02-08 VITALS
BODY MASS INDEX: 30.43 KG/M2 | DIASTOLIC BLOOD PRESSURE: 81 MMHG | HEART RATE: 70 BPM | OXYGEN SATURATION: 96 % | TEMPERATURE: 97.4 F | WEIGHT: 165.34 LBS | SYSTOLIC BLOOD PRESSURE: 146 MMHG | RESPIRATION RATE: 16 BRPM | HEIGHT: 62 IN

## 2018-02-08 DIAGNOSIS — G89.18 POSTOPERATIVE PAIN: ICD-10-CM

## 2018-02-08 LAB
B-HCG FREE SERPL-ACNC: <5 MIU/ML
IHCGL IHCGL: NEGATIVE MIU/ML

## 2018-02-08 PROCEDURE — 501586 HCHG TROCAR, THRD SPIKE 5X55: Performed by: OBSTETRICS & GYNECOLOGY

## 2018-02-08 PROCEDURE — 160048 HCHG OR STATISTICAL LEVEL 1-5: Performed by: OBSTETRICS & GYNECOLOGY

## 2018-02-08 PROCEDURE — 501399 HCHG SPECIMAN BAG, ENDO CATC: Performed by: OBSTETRICS & GYNECOLOGY

## 2018-02-08 PROCEDURE — 700102 HCHG RX REV CODE 250 W/ 637 OVERRIDE(OP)

## 2018-02-08 PROCEDURE — A4338 INDWELLING CATHETER LATEX: HCPCS | Performed by: OBSTETRICS & GYNECOLOGY

## 2018-02-08 PROCEDURE — 160002 HCHG RECOVERY MINUTES (STAT): Performed by: OBSTETRICS & GYNECOLOGY

## 2018-02-08 PROCEDURE — 500886 HCHG PACK, LAPAROSCOPY: Performed by: OBSTETRICS & GYNECOLOGY

## 2018-02-08 PROCEDURE — 502704 HCHG DEVICE, LIGASURE IMPACT: Performed by: OBSTETRICS & GYNECOLOGY

## 2018-02-08 PROCEDURE — 84702 CHORIONIC GONADOTROPIN TEST: CPT

## 2018-02-08 PROCEDURE — 501582 HCHG TROCAR, THRD BLADED: Performed by: OBSTETRICS & GYNECOLOGY

## 2018-02-08 PROCEDURE — 700111 HCHG RX REV CODE 636 W/ 250 OVERRIDE (IP)

## 2018-02-08 PROCEDURE — 700101 HCHG RX REV CODE 250

## 2018-02-08 PROCEDURE — 160009 HCHG ANES TIME/MIN: Performed by: OBSTETRICS & GYNECOLOGY

## 2018-02-08 PROCEDURE — 88302 TISSUE EXAM BY PATHOLOGIST: CPT

## 2018-02-08 PROCEDURE — A9270 NON-COVERED ITEM OR SERVICE: HCPCS

## 2018-02-08 PROCEDURE — 160041 HCHG SURGERY MINUTES - EA ADDL 1 MIN LEVEL 4: Performed by: OBSTETRICS & GYNECOLOGY

## 2018-02-08 PROCEDURE — 501838 HCHG SUTURE GENERAL: Performed by: OBSTETRICS & GYNECOLOGY

## 2018-02-08 PROCEDURE — 160036 HCHG PACU - EA ADDL 30 MINS PHASE I: Performed by: OBSTETRICS & GYNECOLOGY

## 2018-02-08 PROCEDURE — 160029 HCHG SURGERY MINUTES - 1ST 30 MINS LEVEL 4: Performed by: OBSTETRICS & GYNECOLOGY

## 2018-02-08 PROCEDURE — 160035 HCHG PACU - 1ST 60 MINS PHASE I: Performed by: OBSTETRICS & GYNECOLOGY

## 2018-02-08 PROCEDURE — 500868 HCHG NEEDLE, SURGI(VARES): Performed by: OBSTETRICS & GYNECOLOGY

## 2018-02-08 RX ORDER — HALOPERIDOL 5 MG/ML
INJECTION INTRAMUSCULAR
Status: COMPLETED
Start: 2018-02-08 | End: 2018-02-08

## 2018-02-08 RX ORDER — GABAPENTIN 300 MG/1
CAPSULE ORAL
Status: COMPLETED
Start: 2018-02-08 | End: 2018-02-08

## 2018-02-08 RX ORDER — OXYCODONE HYDROCHLORIDE 5 MG/1
2.5 TABLET ORAL
Status: DISCONTINUED | OUTPATIENT
Start: 2018-02-08 | End: 2018-02-08 | Stop reason: HOSPADM

## 2018-02-08 RX ORDER — BUPIVACAINE HYDROCHLORIDE AND EPINEPHRINE 2.5; 5 MG/ML; UG/ML
INJECTION, SOLUTION EPIDURAL; INFILTRATION; INTRACAUDAL; PERINEURAL
Status: DISCONTINUED | OUTPATIENT
Start: 2018-02-08 | End: 2018-02-08 | Stop reason: HOSPADM

## 2018-02-08 RX ORDER — OXYCODONE HYDROCHLORIDE 5 MG/1
5 TABLET ORAL
Qty: 15 TAB | Refills: 0 | Status: SHIPPED | OUTPATIENT
Start: 2018-02-08 | End: 2018-02-11

## 2018-02-08 RX ORDER — CELECOXIB 200 MG/1
200 CAPSULE ORAL 2 TIMES DAILY WITH MEALS
Status: DISCONTINUED | OUTPATIENT
Start: 2018-02-08 | End: 2018-02-08 | Stop reason: HOSPADM

## 2018-02-08 RX ORDER — ACETAMINOPHEN 500 MG
TABLET ORAL
Status: COMPLETED
Start: 2018-02-08 | End: 2018-02-08

## 2018-02-08 RX ORDER — OXYCODONE HCL 5 MG/5 ML
SOLUTION, ORAL ORAL
Status: COMPLETED
Start: 2018-02-08 | End: 2018-02-08

## 2018-02-08 RX ORDER — SODIUM CHLORIDE, SODIUM LACTATE, POTASSIUM CHLORIDE, CALCIUM CHLORIDE 600; 310; 30; 20 MG/100ML; MG/100ML; MG/100ML; MG/100ML
INJECTION, SOLUTION INTRAVENOUS CONTINUOUS
Status: DISCONTINUED | OUTPATIENT
Start: 2018-02-08 | End: 2018-02-08 | Stop reason: HOSPADM

## 2018-02-08 RX ORDER — ONDANSETRON 2 MG/ML
4 INJECTION INTRAMUSCULAR; INTRAVENOUS EVERY 6 HOURS PRN
Status: DISCONTINUED | OUTPATIENT
Start: 2018-02-08 | End: 2018-02-08 | Stop reason: HOSPADM

## 2018-02-08 RX ORDER — CELECOXIB 200 MG/1
CAPSULE ORAL
Status: COMPLETED
Start: 2018-02-08 | End: 2018-02-08

## 2018-02-08 RX ORDER — ACETAMINOPHEN 500 MG
1000 TABLET ORAL EVERY 6 HOURS
Status: DISCONTINUED | OUTPATIENT
Start: 2018-02-08 | End: 2018-02-08 | Stop reason: HOSPADM

## 2018-02-08 RX ADMIN — SODIUM CHLORIDE, SODIUM LACTATE, POTASSIUM CHLORIDE, CALCIUM CHLORIDE 1000 ML: 600; 310; 30; 20 INJECTION, SOLUTION INTRAVENOUS at 07:30

## 2018-02-08 RX ADMIN — GABAPENTIN 300 MG: 300 CAPSULE ORAL at 07:30

## 2018-02-08 RX ADMIN — ACETAMINOPHEN 1000 MG: 500 TABLET, FILM COATED ORAL at 07:30

## 2018-02-08 RX ADMIN — OXYCODONE HYDROCHLORIDE 10 MG: 5 SOLUTION ORAL at 09:15

## 2018-02-08 RX ADMIN — FENTANYL CITRATE 25 MCG: 50 INJECTION, SOLUTION INTRAMUSCULAR; INTRAVENOUS at 09:15

## 2018-02-08 RX ADMIN — HALOPERIDOL LACTATE 1 MG: 5 INJECTION, SOLUTION INTRAMUSCULAR at 10:27

## 2018-02-08 RX ADMIN — CELECOXIB 400 MG: 200 CAPSULE ORAL at 07:30

## 2018-02-08 RX ADMIN — FENTANYL CITRATE 25 MCG: 50 INJECTION, SOLUTION INTRAMUSCULAR; INTRAVENOUS at 09:35

## 2018-02-08 ASSESSMENT — PAIN SCALES - GENERAL
PAINLEVEL_OUTOF10: 5
PAINLEVEL_OUTOF10: 2
PAINLEVEL_OUTOF10: 0
PAINLEVEL_OUTOF10: 4
PAINLEVEL_OUTOF10: 3
PAINLEVEL_OUTOF10: 4
PAINLEVEL_OUTOF10: 4

## 2018-02-08 NOTE — OR NURSING
0858 Pt. Received from OR, report from Dr. Yoon. Respirations even unlabored. Oral airway in place, pt. Obstructing. Jaw thrust provided. Oxygen in place. No signs of nausea or vomiting at this time.   0906: Oral airway removed, pt waking up. Surgical site visualized one site at umbilicus and one site pericervical both sites covered in a dressing CDI. Laurel-pad in place CDI.   0910: Pt. Tolerating orals without difficulty.   0915: Pt. c/o of pain in pelvis, medicated with IV and oral pain medications.   0935: Pt. Medicated with IV Fentanyl. Family brought to bedside.  1010: Pt. Assisted to restroom, voided without difficulty. Pt. Dressed.   1015: Pt. Up in recliner chair,  at bedside. Discussed discharge instructions with pt. And . They verbalize understanding, all questions answered.    1025: Pt. Having nausea requests medication, medicated with Haldol for nausea.   1042: Pt. Started to desaturate after Haldol. Encouraged deep breathing.   1048: pt. Still desaturating on and off, and pt. Sleepy. Pt. Placed back on 2 liters of oxygen. O2 sat. Improved.   1115: pt. Sleeping. Oxygen removed, will monitor O2 saturation.   1150: Pt. Discharged home, O2 saturations above 90% for over 30 minutes. In stable condition. Pain tolerable and nausea subsided. Escorted out via w/c with belongings.

## 2018-02-08 NOTE — OR SURGEON
Immediate Post OP Note    PreOp Diagnosis: desires permanent sterilization, multiparity, asthma    PostOp Diagnosis: same as above    Procedure(s):  PELVISCOPY - Wound Class: Clean  SALPINGECTOMY - Wound Class: Clean    Surgeon(s):  Alina Cherry M.D.    Anesthesiologist/Type of Anesthesia:  Anesthesiologist: Vincent Bledsoe M.D./General    Surgical Staff:  Circulator: Cami Martinez R.N.; Marti Richardson R.N.  Scrub Person: Karolina Luther    Specimens:  Fallopian tubes    Estimated Blood Loss: less than 10cc    Findings: parous cervix and uterus, nl tubes and ovaries    Complications: none        2/8/2018 8:53 AM Alina Cherry

## 2018-02-08 NOTE — DISCHARGE INSTRUCTIONS
ACTIVITY: Rest and take it easy for the first 24 hours.  A responsible adult is recommended to remain with you during that time.  It is normal to feel sleepy.  We encourage you to not do anything that requires balance, judgment or coordination.    MILD FLU-LIKE SYMPTOMS ARE NORMAL. YOU MAY EXPERIENCE GENERALIZED MUSCLE ACHES, THROAT IRRITATION, HEADACHE AND/OR SOME NAUSEA.    FOR 24 HOURS DO NOT:  Drive, operate machinery or run household appliances.  Drink beer or alcoholic beverages.   Make important decisions or sign legal documents.    SPECIAL INSTRUCTIONS: SEE HANDOUT  Pelvic rest and no heavy lifting more than 20 lb for 6 weeks   Call for heavy bleeding or evidence of wound infection    DIET: To avoid nausea, slowly advance diet as tolerated, avoiding spicy or greasy foods for the first day.  Add more substantial food to your diet according to your physician's instructions.  Babies can be fed formula or breast milk as soon as they are hungry.  INCREASE FLUIDS AND FIBER TO AVOID CONSTIPATION.    SURGICAL DRESSING/BATHING: SEE HANDOUT    FOLLOW-UP APPOINTMENT:  A follow-up appointment should be arranged with your doctor DR. PRATER in 2 weeks.    You should CALL YOUR PHYSICIAN if you develop:  Fever greater than 101 degrees F.  Pain not relieved by medication, or persistent nausea or vomiting.  Excessive bleeding (blood soaking through dressing) or unexpected drainage from the wound.  Extreme redness or swelling around the incision site, drainage of pus or foul smelling drainage.  Inability to urinate or empty your bladder within 8 hours.  Problems with breathing or chest pain.    You should call 911 if you develop problems with breathing or chest pain.  If you are unable to contact your doctor or surgical center, you should go to the nearest emergency room or urgent care center.  Physician's telephone #: [phone]    If any questions arise, call your doctor.  If your doctor is not available, please feel free to  call the Surgical Center at [phone]. The Center is open Monday through Friday from 7AM to 7PM.  You can also call the HEALTH HOTLINE open 24 hours/day, 7 days/week and speak to a nurse at [phone], or toll free at [phone].    A registered nurse may call you a few days after your surgery to see how you are doing after your procedure.    MEDICATIONS: Resume taking daily medication.  Take prescribed pain medication with food.  If no medication is prescribed, you may take non-aspirin pain medication if needed.  PAIN MEDICATION CAN BE VERY CONSTIPATING.  Take a stool softener or laxative such as senokot, pericolace, or milk of magnesia if needed.    Prescription given for OXYCODONE IR.  Last pain medication given at 09:15 AM, CAN TAKE AGAIN AT 1:15 PM.    If your physician has prescribed pain medication that includes Acetaminophen (Tylenol), do not take additional Acetaminophen (Tylenol) while taking the prescribed medication.    Depression / Suicide Risk    As you are discharged from this [facility], it is important to learn how to keep safe from harming yourself.    Recognize the warning signs:  · Abrupt changes in personality, positive or negative- including increase in energy   · Giving away possessions  · Change in eating patterns- significant weight changes-  positive or negative  · Change in sleeping patterns- unable to sleep or sleeping all the time   · Unwillingness or inability to communicate  · Depression  · Unusual sadness, discouragement and loneliness  · Talk of wanting to die  · Neglect of personal appearance   · Rebelliousness- reckless behavior  · Withdrawal from people/activities they love  · Confusion- inability to concentrate     If you or a loved one observes any of these behaviors or has concerns about self-harm, here's what you can do:  · Talk about it- your feelings and reasons for harming yourself  · Remove any means that you might use to hurt yourself (examples:  pills, rope, extension cords, firearm)  · Get professional help from the community (Mental Health, Substance Abuse, psychological counseling)  · Do not be alone:Call your Safe Contact- someone whom you trust who will be there for you.  · Call your local CRISIS HOTLINE 917-0852 or 717-261-9878  · Call your local Children's Mobile Crisis Response Team [place] (358) 515-5524 or www.[employer]  · Call the toll free National Suicide Prevention Hotlines   · National Suicide Prevention Lifeline 060-836-FJNE (6846)  · National Hope Line Network 800-SUICIDE (849-9154)

## 2018-02-08 NOTE — OP REPORT
DATE OF SERVICE:  02/08/2018    PREOPERATIVE DIAGNOSES:  1.  Multiparity.  2.  Desires permanent sterilization.  3.  Asthma.    POSTOPERATIVE DIAGNOSES:  1.  Multiparity.  2.  Desires permanent sterilization.  3.  Asthma.    OPERATIONS/PROCEDURES PERFORMED:  Diagnostic laparoscopy,   laparoscopic-assisted bilateral salpingectomy.    SURGEON:  Alina Cherry MD    ASSISTANT:  None.    ANESTHESIOLOGIST:  Vincent Morrow MD    ANESTHESIA:  General endotracheal.    COMPLICATIONS:  None.    ESTIMATED BLOOD LOSS:  Less than 10 mL    FINDINGS:  Parous cervix, enlarged globular parous uterus consistent with   recent delivery.  Normal tubes and ovaries.    PROCEDURE PERFORMED IN DETAIL:  Patient is a 37-year-old parous female who   does have her second delivery about 7 weeks ago with Dr. Petar Farrell.  She was   referred back to me for interested sterilization procedure.  We discussed her   other options including work methods as well and she wished to proceed with a   laparoscopic bilateral salpingectomy, also discussed Essure.  Patient went to   it have this week.  We also discussed because she has stress incontinence that   I would recommend waiting because she will have postpartum changes that will   continue for at least a years' time before dressing that.  We discussed timed   voids and caffeine use as well as Kiegel exercises.    Patient was taken to the operating room after informed consent was obtained.    We did a  check.  We had her sign a narcotic risk and did a risk   assessment, which was 1 only for being a female.  We also gave her nonnarcotic   options.  She also understands how to dispense of the medication   appropriately and what to do with an overdose.    Patient was consented for the procedure.  Risks of the procedure included   infection, bleeding, transfusion, transfusion-related complications, risks of   intraabdominal organ injury, injury to bowel/bladder, risk of failure of the   procedure and  increased risk for tubal gestation, although unlikely risk of,   she could still have ovarian cancer in the future, although taking her entire   tubal length minimizes or decreases that risk.  We also discussed the risks of   general anesthesia and need further surgery in the future.  She understands   and wishes to proceed.    Patient was administered general anesthesia.  After she was prepped and draped   in the usual sterile fashion, time-out was called.  We verified patient and   procedure.  Juárez catheter was placed and exam was performed.  She has a   parous midline soft uterus about 8 week's size.  Cervix was grasped at 12   o'clock position with a single tooth tenaculum and a acorn uterine manipulator   was advanced into the uterus.  Juárez catheter had been placed to drain the   bladder.  Patient was repositioned and 0.25% Marcaine was injected in the   subumbilical fold.  Veress needle was introduced, passed the drop test,   initial pressures on low flow were 0, approximately 3.5 L of CO2 gas were used   to insufflate the belly.  The Veress needle was removed and trocar advanced   into the abdomen and it revealed the above findings.  Second incision was made   2 cm above the symphysis pubis in the midline and under direct visualization,   0.25% Marcaine with epinephrine was injected and a 5 mm trocar advanced under   direct visualization.  Using the Prestige grasper, I grasped and discovered   the right fallopian tube to the distal end and using the CovidiPingMe LigaSure   apparatus with an amp meter, we removed the right fallopian tube from the   mesosalpinx to the cornua and a similar procedure was performed on the   contralateral fallopian tube.  Both tubes were removed through the operating   port.  Survey of the patient's pelvis revealed excellent hemostasis.  All   instruments were removed from the abdominopelvic cavity.  The subumbilical   incision fascia was closed with a figure-of-eight stitch of 0  Vicryl,   subcuticular stitch of 4-0 Vicryl was used to close the skin.  All instruments   and Juárez catheter were removed from the vagina.  Excellent hemostasis was   noted.  Patient went to the recovery room in stable condition.    PATHOLOGY:  Fallopian tubes.       ____________________________________     MD DIMAS REYES / MULUGETA    DD:  02/08/2018 08:59:43  DT:  02/08/2018 09:44:13    D#:  [id number]  Job#:  [id number]

## 2018-03-19 ENCOUNTER — HOSPITAL ENCOUNTER (OUTPATIENT)
Dept: RADIOLOGY | Facility: MEDICAL CENTER | Age: 38
End: 2018-03-19
Attending: NURSE PRACTITIONER
Payer: COMMERCIAL

## 2018-03-19 ENCOUNTER — OFFICE VISIT (OUTPATIENT)
Dept: URGENT CARE | Facility: PHYSICIAN GROUP | Age: 38
End: 2018-03-19
Payer: COMMERCIAL

## 2018-03-19 VITALS
TEMPERATURE: 100.1 F | WEIGHT: 161 LBS | OXYGEN SATURATION: 93 % | HEIGHT: 62 IN | RESPIRATION RATE: 16 BRPM | HEART RATE: 110 BPM | DIASTOLIC BLOOD PRESSURE: 78 MMHG | SYSTOLIC BLOOD PRESSURE: 122 MMHG | BODY MASS INDEX: 29.63 KG/M2

## 2018-03-19 DIAGNOSIS — R05.9 COUGH: ICD-10-CM

## 2018-03-19 DIAGNOSIS — J18.9 PNEUMONIA OF RIGHT LOWER LOBE DUE TO INFECTIOUS ORGANISM: ICD-10-CM

## 2018-03-19 DIAGNOSIS — J45.901 EXACERBATION OF ASTHMA, UNSPECIFIED ASTHMA SEVERITY, UNSPECIFIED WHETHER PERSISTENT: ICD-10-CM

## 2018-03-19 LAB
FLUAV+FLUBV AG SPEC QL IA: NEGATIVE
INT CON NEG: NORMAL
INT CON POS: NORMAL

## 2018-03-19 PROCEDURE — 71046 X-RAY EXAM CHEST 2 VIEWS: CPT

## 2018-03-19 PROCEDURE — 87804 INFLUENZA ASSAY W/OPTIC: CPT | Performed by: NURSE PRACTITIONER

## 2018-03-19 PROCEDURE — 94640 AIRWAY INHALATION TREATMENT: CPT | Performed by: NURSE PRACTITIONER

## 2018-03-19 PROCEDURE — 94760 N-INVAS EAR/PLS OXIMETRY 1: CPT | Performed by: NURSE PRACTITIONER

## 2018-03-19 PROCEDURE — 99214 OFFICE O/P EST MOD 30 MIN: CPT | Mod: 25 | Performed by: NURSE PRACTITIONER

## 2018-03-19 RX ORDER — BENZONATATE 100 MG/1
100 CAPSULE ORAL 3 TIMES DAILY PRN
Qty: 60 CAP | Refills: 0 | Status: SHIPPED | OUTPATIENT
Start: 2018-03-19 | End: 2019-05-13

## 2018-03-19 RX ORDER — IPRATROPIUM BROMIDE AND ALBUTEROL SULFATE 2.5; .5 MG/3ML; MG/3ML
3 SOLUTION RESPIRATORY (INHALATION) ONCE
Status: COMPLETED | OUTPATIENT
Start: 2018-03-19 | End: 2018-03-19

## 2018-03-19 RX ORDER — PREDNISONE 10 MG/1
20 TABLET ORAL 2 TIMES DAILY
Qty: 20 TAB | Refills: 0 | Status: SHIPPED | OUTPATIENT
Start: 2018-03-19 | End: 2018-03-24

## 2018-03-19 RX ORDER — AMOXICILLIN 500 MG/1
1000 CAPSULE ORAL 3 TIMES DAILY
Qty: 42 CAP | Refills: 0 | Status: SHIPPED | OUTPATIENT
Start: 2018-03-19 | End: 2018-03-26

## 2018-03-19 RX ORDER — DOXYCYCLINE 100 MG/1
100 CAPSULE ORAL 2 TIMES DAILY
Qty: 14 CAP | Refills: 0 | Status: SHIPPED | OUTPATIENT
Start: 2018-03-19 | End: 2018-03-26

## 2018-03-19 RX ADMIN — IPRATROPIUM BROMIDE AND ALBUTEROL SULFATE 3 ML: 2.5; .5 SOLUTION RESPIRATORY (INHALATION) at 10:49

## 2018-03-19 ASSESSMENT — PAIN SCALES - GENERAL: PAINLEVEL: NO PAIN

## 2018-03-19 NOTE — PROGRESS NOTES
Chief Complaint   Patient presents with   • Flu Like Symptoms     x 3 days       HISTORY OF PRESENT ILLNESS: Patient is a 37 y.o. female who presents today due to symptoms which started last week with worsening three days ago. Pt reports a fever, chills, body aches. Reports associated cough, wheezing, sore throat, nasal congestion, headache, and fatigue. Denies blood in sputum, chest pain, shortness of breath, nausea, vomiting, or diarrhea. Admits to h/o asthma, last took her inhaler this morning. No immunocompromise. Has tried OTC cold/flu medications without significant relief of symptoms. No recent ABX use. No other aggravating or alleviating factors. She did receive a flu vaccination this year.     Patient Active Problem List    Diagnosis Date Noted   • Postoperative pain 02/08/2018     Priority: High     Class: Acute   • Allergic rhinitis due to pollen 06/24/2015   • Onychomycosis 06/24/2015   • Hyperlipidemia 02/11/2015   • Pre-conception counseling 02/11/2015   • Hypertension 01/06/2015   • Hives 01/06/2015       Allergies:Shellfish allergy    Current Outpatient Prescriptions Ordered in [facility]   Medication Sig Dispense Refill   • Multiple Vitamin (MULTI-VITAMIN PO) Take  by mouth every day.     • Budesonide (PULMICORT INH) Inhale  by mouth every day.     • Albuterol Sulfate (PROAIR HFA INH) Inhale  by mouth as needed.     • Inulin (FIBER CHOICE PO) Take  by mouth every day.     • loratadine (CLARITIN) 10 MG Tab Take 10 mg by mouth every day.       Current Facility-Administered Medications Ordered in Epic   Medication Dose Route Frequency Provider Last Rate Last Dose   • ipratropium-albuterol (DUONEB) nebulizer solution 3 mL  3 mL Nebulization Once ANGEL Richardson           Past Medical History:   Diagnosis Date   • Allergic rhinitis due to pollen 6/24/2015   • Asthma    • Diabetes (CMS-HCC)     gestational only   • Hives 1/6/2015   • Hyperlipidemia 2/11/2015   • Hypertension 1/6/2015   • Onychomycosis  "2015       Social History   Substance Use Topics   • Smoking status: Former Smoker     Packs/day: 1.00     Years: 25.00     Types: Cigarettes     Quit date: 3/1/2017   • Smokeless tobacco: Never Used   • Alcohol use 1.0 oz/week     2 Shots of liquor per week      Comment: 3 per week        Family Status   Relation Status   • Mother Alive   • Father Alive   • Brother Alive   • Maternal Grandmother    • Maternal Grandfather    • Paternal Grandmother    • Paternal Grandfather    • Brother Alive   • Daughter Alive     Family History   Problem Relation Age of Onset   • Hypertension Mother    • Diabetes Father        ROS:  Review of Systems   Constitutional: Positive for subjective fever, chills, fatigue, malaise. Negative for weight loss.  HENT: Positive for congestion and sore throat. Negative for ear pain, nosebleeds, and neck pain.    Eyes: Negative for vision changes.   Cardiovascular: Negative for chest pain, palpitations, orthopnea and leg swelling.   Respiratory: Positive for cough, wheezing. Negative for shortness of breath.  Gastrointestinal: Negative for abdominal pain, nausea, vomiting or diarrhea.   Skin: Negative for rash, diaphoresis.     Exam:  Blood pressure 122/78, pulse (!) 110, temperature 37.8 °C (100.1 °F), resp. rate 16, height 1.575 m (5' 2\"), weight 73 kg (161 lb), SpO2 93 %.  General: well-nourished, well-developed female, appears uncomfortable, non-toxic in appearance.   Head: normocephalic, atraumatic.  Eyes: PERRLA, EOM within normal limits, no conjunctival injection, no scleral icterus, visual fields and acuity grossly intact.  Ears: normal shape and symmetry, no tenderness, no discharge. External canals are without any significant edema or erythema. Tympanic membranes are without any inflammation, no effusion. Gross auditory acuity is intact.  Nose: symmetrical without tenderness, mild discharge, erythema present bilateral nares.  Mouth/Throat: reasonable " "hygiene, no exudates or tonsillar enlargement. Erythema present.   Neck: no masses, range of motion within normal limits, no tracheal deviation.  Lymph: mild cervical adenopathy. No supraclavicular adenopathy.   Neuro: alert and oriented. Cranial nerves 1-12 grossly intact.   Cardiovascular: tachycardic rate and regular rhythm without murmurs, rubs, or gallops. No edema.   Pulmonary: no distress. Chest is symmetrical with respiration, no crackles or rhonchi. Diminished with wheezes throughout.   Musculoskeletal: appropriate muscle tone, gait is stable.  Skin: warm, dry, intact, no clubbing, no cyanosis.   Psych: appropriate mood, affect, judgement.         Assessment/Plan:  1. Pneumonia of right lower lobe due to infectious organism (CMS-[facility])  POCT Influenza A/B    amoxicillin (AMOXIL) 500 MG Cap    doxycycline (MONODOX) 100 MG capsule   2. Exacerbation of asthma, unspecified asthma severity, unspecified whether persistent  ipratropium-albuterol (DUONEB) nebulizer solution 3 mL    amoxicillin (AMOXIL) 500 MG Cap    doxycycline (MONODOX) 100 MG capsule    predniSONE (DELTASONE) 10 MG Tab   3. Cough  DX-CHEST-2 VIEWS    benzonatate (TESSALON) 100 MG Cap         POC flu negative      DX chest reviewed by myself, radiology reading \"Right greater than left lower lobe consolidation is compatible with pneumonia. Recommend follow-up to resolution.\"        The patient is given a DuoNeb in clinic, tolerated well and reported improvement post treatment. Her oxygen saturations remained around 93-94% posttreatment. Amoxicillin and doxycycline as directed for right lower lobe pneumonia. Probiotic use strongly encouraged. Prednisone as directed. Tessalon Perles as needed. Continue home asthma medication as previously prescribed. The patient is nontoxic in the clinic today, nevertheless I have instructed the patient to have close follow-up tomorrow with her PCP and/or urgent care, she is in agreement. Rest, increase fluid intake, " hand and respiratory hygiene. OTC cough/cold medications as directed.   Supportive care, differential diagnoses, and indications for immediate follow-up discussed with patient.   Pathogenesis of diagnosis discussed including typical length and natural progression.   Instructed to return to clinic or nearest emergency department for any change in condition, further concerns, or worsening of symptoms.  Patient states understanding of the plan of care and discharge instructions.  Instructed to make an appointment, for follow up, with their primary care provider.        MAYTE Richardson.

## 2018-03-19 NOTE — LETTER
March 19, 2018         Patient: Bernice Billings   YOB: 1980   Date of Visit: 3/19/2018           To Whom it May Concern:    Bernice Billings was seen in my clinic on 3/19/2018. She should refrain from work for at least the next three days or until she has been fever free for at least 24 hours.       If you have any questions or concerns, please don't hesitate to call.        Sincerely,           MAYTE Richardson.  Electronically Signed

## 2018-03-20 ENCOUNTER — OFFICE VISIT (OUTPATIENT)
Dept: URGENT CARE | Facility: PHYSICIAN GROUP | Age: 38
End: 2018-03-20
Payer: COMMERCIAL

## 2018-03-20 VITALS
BODY MASS INDEX: 29.63 KG/M2 | OXYGEN SATURATION: 96 % | HEIGHT: 62 IN | SYSTOLIC BLOOD PRESSURE: 138 MMHG | HEART RATE: 92 BPM | TEMPERATURE: 97.1 F | RESPIRATION RATE: 16 BRPM | WEIGHT: 161 LBS | DIASTOLIC BLOOD PRESSURE: 86 MMHG

## 2018-03-20 DIAGNOSIS — R05.9 COUGH: ICD-10-CM

## 2018-03-20 DIAGNOSIS — J18.9 PNEUMONIA OF RIGHT LOWER LOBE DUE TO INFECTIOUS ORGANISM: ICD-10-CM

## 2018-03-20 DIAGNOSIS — J45.901 EXACERBATION OF ASTHMA, UNSPECIFIED ASTHMA SEVERITY, UNSPECIFIED WHETHER PERSISTENT: ICD-10-CM

## 2018-03-20 PROCEDURE — 99213 OFFICE O/P EST LOW 20 MIN: CPT | Performed by: PHYSICIAN ASSISTANT

## 2018-03-20 ASSESSMENT — ENCOUNTER SYMPTOMS
VOMITING: 0
SORE THROAT: 0
RHINORRHEA: 0
HEMOPTYSIS: 0
COUGH: 1
BACK PAIN: 1
FEVER: 0
NAUSEA: 0
SWEATS: 0
WHEEZING: 1
SHORTNESS OF BREATH: 0
CHILLS: 0
MYALGIAS: 0
DIARRHEA: 0

## 2018-03-21 NOTE — PROGRESS NOTES
"Subjective:   Benrice Billings is a 37 y.o. female who presents for Follow-Up    Patient was seen in urgent care yesterday with cough and fever diagnosed with pneumonia and acute exacerbation of asthma with recommendation to follow-up today. The patient is taking medication as instructed. She reports that she is feeling much better and has had no fever since yesterday. She still has a decreased appetite and reports that her chest hurts when she coughs.       Cough   This is a new problem. The current episode started in the past 7 days. The problem has been gradually improving. The problem occurs every few minutes. The cough is productive of purulent sputum. Associated symptoms include chest pain, nasal congestion and wheezing. Pertinent negatives include no chills, ear congestion, ear pain, fever, hemoptysis, myalgias, rash, rhinorrhea, sore throat, shortness of breath or sweats. The symptoms are aggravated by exercise. She has tried oral steroids, a beta-agonist inhaler and prescription cough suppressant for the symptoms. The treatment provided moderate relief. Her past medical history is significant for asthma and pneumonia.     Review of Systems   Constitutional: Negative for chills and fever.   HENT: Positive for congestion. Negative for ear pain, rhinorrhea and sore throat.    Respiratory: Positive for cough and wheezing. Negative for hemoptysis and shortness of breath.    Cardiovascular: Positive for chest pain.   Gastrointestinal: Negative for diarrhea, nausea and vomiting.   Musculoskeletal: Positive for back pain. Negative for myalgias.   Skin: Negative for rash.   All other systems reviewed and are negative.       Objective:   /86   Pulse 92   Temp 36.2 °C (97.1 °F)   Resp 16   Ht 1.575 m (5' 2\")   Wt 73 kg (161 lb)   SpO2 96%   BMI 29.45 kg/m²   Physical Exam   Constitutional: She is oriented to person, place, and time. She appears well-developed and well-nourished. No distress.   HENT: "   Head: Normocephalic and atraumatic.   Right Ear: External ear normal.   Left Ear: External ear normal.   Mouth/Throat: Oropharynx is clear and moist. No oropharyngeal exudate.   + nasal congestion, turbinates swollen/red   Eyes: Conjunctivae and EOM are normal. Pupils are equal, round, and reactive to light.   Neck: Normal range of motion. Neck supple.   Cardiovascular: Normal rate, regular rhythm and normal heart sounds.  Exam reveals no gallop and no friction rub.    No murmur heard.  Pulmonary/Chest: Effort normal. No respiratory distress. She has wheezes. She has rales. She exhibits no tenderness.   + decreased breath sounds bilateral Lower Lobes with rales bilateral lower lobes and occasional expiratory wheezes   Abdominal: Soft. Bowel sounds are normal. There is no tenderness.   Musculoskeletal: Normal range of motion.   Lymphadenopathy:     She has no cervical adenopathy.        Right: No supraclavicular adenopathy present.        Left: No supraclavicular adenopathy present.   Neurological: She is alert and oriented to person, place, and time.   Skin: Skin is warm and dry. No rash noted.   Psychiatric: She has a normal mood and affect. Judgment normal.          Assessment/Plan:     1. Pneumonia of right lower lobe due to infectious organism (CMS-HCC)     Pt is feeling much better, she is non toxic, vital signs stable and Pulse ox is improved. Continue all medications as instructed. Will need f/u xray at 10 days - 14 days. Pt will try to establish with a PCP for this or return to UC if needed.     2. Exacerbation of asthma, unspecified asthma severity, unspecified whether persistent    3. Cough  Differential diagnosis, natural history, supportive care, and indications for immediate follow-up discussed.    If not improving in 3-5 days, F/U with PCP or return to UC or sooner if worsens  Strict ER precautions given.

## 2019-05-13 ENCOUNTER — OFFICE VISIT (OUTPATIENT)
Dept: MEDICAL GROUP | Facility: PHYSICIAN GROUP | Age: 39
End: 2019-05-13
Payer: COMMERCIAL

## 2019-05-13 VITALS
DIASTOLIC BLOOD PRESSURE: 90 MMHG | HEIGHT: 62 IN | WEIGHT: 164 LBS | OXYGEN SATURATION: 99 % | TEMPERATURE: 98.3 F | SYSTOLIC BLOOD PRESSURE: 150 MMHG | HEART RATE: 80 BPM | BODY MASS INDEX: 30.18 KG/M2

## 2019-05-13 DIAGNOSIS — E78.5 HYPERLIPIDEMIA, UNSPECIFIED HYPERLIPIDEMIA TYPE: ICD-10-CM

## 2019-05-13 DIAGNOSIS — I10 ESSENTIAL HYPERTENSION: ICD-10-CM

## 2019-05-13 DIAGNOSIS — R53.83 TIREDNESS: ICD-10-CM

## 2019-05-13 DIAGNOSIS — F17.200 NICOTINE DEPENDENCE WITH CURRENT USE: ICD-10-CM

## 2019-05-13 DIAGNOSIS — Z12.39 SCREENING FOR BREAST CANCER: ICD-10-CM

## 2019-05-13 DIAGNOSIS — J30.1 ALLERGIC RHINITIS DUE TO POLLEN, UNSPECIFIED SEASONALITY: ICD-10-CM

## 2019-05-13 PROCEDURE — 99214 OFFICE O/P EST MOD 30 MIN: CPT | Performed by: NURSE PRACTITIONER

## 2019-05-13 RX ORDER — LOSARTAN POTASSIUM 50 MG/1
50 TABLET ORAL DAILY
Qty: 90 TAB | Refills: 3 | Status: SHIPPED | OUTPATIENT
Start: 2019-05-13 | End: 2019-10-01

## 2019-05-13 ASSESSMENT — PATIENT HEALTH QUESTIONNAIRE - PHQ9: CLINICAL INTERPRETATION OF PHQ2 SCORE: 0

## 2019-05-13 NOTE — PROGRESS NOTES
Chief Complaint   Patient presents with   • Blood Pressure Problem     pt stopped meds during pregnancy   • Establish Care         This is a 38 y.o.female patient that presents today with the following: Hypertension    Hypertension  This is a chronic condition, currently uncontrolled.  She has been on blood pressure medications in the past but was discontinued when she became pregnant and her blood pressure normalized.  Today it is 150/98, she does report associated symptoms including fatigue, irritability and an overall feeling of unwellness.  She does agree to restarting blood pressure medication, she will start losartan 50 mg daily and follow-up in 1 week for blood pressure check.  She is otherwise going to follow-up with me in 3 to 4 months with labs done before visit.    Allergic rhinitis due to pollen  This is chronic condition, currently treated with allergy shots through allergist.    Nicotine dependence with current use  This is a chronic and stable condition.  This is a chronic condition, uncontrolled.  She does understand the risks associated with tobacco use.  She is not interested in quitting at this time but will let me know when she is ready.    Hyperlipidemia  The ASCVD Risk score (Love JUAN DAVID Jr., et al., 2013) failed to calculate for the following reasons:    Cannot find a previous HDL lab    Cannot find a previous total cholesterol lab  Patient due for labs, these have been ordered.      No visits with results within 1 Month(s) from this visit.   Latest known visit with results is:   Office Visit on 03/19/2018   Component Date Value   • Rapid Influenza A-B 03/19/2018 Negative    • Internal Control Positive 03/19/2018 Valid    • Internal Control Negative 03/19/2018 Valid          clinical course has been stable    Past Medical History:   Diagnosis Date   • Allergic rhinitis due to pollen 6/24/2015   • Asthma    • Diabetes (HCC)     gestational only   • Hives 1/6/2015   • Hyperlipidemia 2/11/2015   •  "Hypertension 1/6/2015   • Onychomycosis 6/24/2015       Past Surgical History:   Procedure Laterality Date   • PELVISCOPY N/A 2/8/2018    Procedure: PELVISCOPY;  Surgeon: Alina Cherry M.D.;  Location: SURGERY SAME DAY [facility];  Service: Gynecology   • SALPINGECTOMY Bilateral 2/8/2018    Procedure: SALPINGECTOMY;  Surgeon: Alina Cherry M.D.;  Location: SURGERY SAME DAY [facility];  Service: Gynecology       Family History   Problem Relation Age of Onset   • Hypertension Mother    • Diabetes Father        Shellfish allergy    Current Outpatient Prescriptions Ordered in [facility]   Medication Sig Dispense Refill   • BREO ELLIPTA 200-25 MCG/INH AEROSOL POWDER, BREATH ACTIVATED INHALE 1 PUFF BY MOUTH DAILY RINSE AND GARGLE AFTER USE  1   • losartan (COZAAR) 50 MG Tab Take 1 Tab by mouth every day. 90 Tab 3   • Multiple Vitamin (MULTI-VITAMIN PO) Take  by mouth every day.     • Albuterol Sulfate (PROAIR HFA INH) Inhale  by mouth as needed.     • loratadine (CLARITIN) 10 MG Tab Take 10 mg by mouth every day.       No current [facility]-ordered facility-administered medications on file.        Constitutional ROS: No unexpected change in weight, No weakness, No unexplained fevers, sweats, or chills  Pulmonary ROS: No chronic cough, sputum, or hemoptysis, No shortness of breath, No recent change in breathing.  Positive for chronic allergies, smoker  Cardiovascular ROS: No chest pain, No edema, No palpitations, Positive for hypertension   Musculoskeletal/Extremities ROS: No clubbing, No peripheral edema, No pain, redness or swelling on the joints  Neurologic ROS: Normal development, No seizures, No weakness    Physical exam:  /90   Pulse 80   Temp 36.8 °C (98.3 °F) (Temporal)   Ht 1.575 m (5' 2\")   Wt 74.4 kg (164 lb)   LMP 04/23/2019 (Approximate)   SpO2 99%   BMI 30.00 kg/m²   General Appearance: Very pleasant middle-aged female, alert, no distress, obese, well-groomed  Skin: Skin color, texture, turgor normal. No " rashes or lesions.  Lungs: negative findings: normal respiratory rate and rhythm, lungs clear to auscultation  Heart: negative. RRR without murmur, gallop, or rubs.  No ectopy.  Abdomen: Abdomen soft, non-tender. BS normal. No masses,  No organomegaly  Musculoskeletal: negative findings: no evidence of joint instability, no evidence of muscle atrophy, no deformities present  Neurologic: intact, CN II through XII grossly intact    Medical decision making/discussion: Patient is going to have routine fasting labs, these have been ordered.  She does agree to starting antihypertensive, she will start losartan 50 mg daily as she has been on this before.  She is going to return in 1 week for blood pressure check.  She is also due for her very first mammogram, this is been ordered.    Bernice was seen today for blood pressure problem and establish care.    Diagnoses and all orders for this visit:    Nicotine dependence with current use    Essential hypertension  -     Comp Metabolic Panel; Future  -     Lipid Profile; Future  -     losartan (COZAAR) 50 MG Tab; Take 1 Tab by mouth every day.    Tiredness  -     CBC WITH DIFFERENTIAL; Future  -     TSH WITH REFLEX TO FT4; Future  -     VITAMIN D,25 HYDROXY; Future  -     VITAMIN B12; Future    Allergic rhinitis due to pollen, unspecified seasonality    Screening for breast cancer  -     MA-SCREEN MAMMO W/CAD-BILAT; Future    Hyperlipidemia, unspecified hyperlipidemia type        Return in about 4 months (around 9/13/2019) for Follow-up, Discuss Labs.        Please note that this dictation was created using voice recognition software. I have made every reasonable attempt to correct obvious errors, but I expect that there are errors of grammar and possibly content that I did not discover before finalizing the note.

## 2019-05-13 NOTE — ASSESSMENT & PLAN NOTE
This is a chronic condition, currently uncontrolled.  She has been on blood pressure medications in the past but was discontinued when she became pregnant and her blood pressure normalized.  Today it is 150/98, she does report associated symptoms including fatigue, irritability and an overall feeling of unwellness.  She does agree to restarting blood pressure medication, she will start losartan 50 mg daily and follow-up in 1 week for blood pressure check.  She is otherwise going to follow-up with me in 3 to 4 months with labs done before visit.

## 2019-05-13 NOTE — PATIENT INSTRUCTIONS
Labs anytime    Will have you start losartan 50 mg daily    Have BP check in about a week    Mammogram    Follow up with me in 4 months, sooner if needed

## 2019-05-13 NOTE — ASSESSMENT & PLAN NOTE
The ASCVD Risk score (Love FALL Jr., et al., 2013) failed to calculate for the following reasons:    Cannot find a previous HDL lab    Cannot find a previous total cholesterol lab  Patient due for labs, these have been ordered.

## 2019-05-23 ENCOUNTER — NON-PROVIDER VISIT (OUTPATIENT)
Dept: MEDICAL GROUP | Facility: PHYSICIAN GROUP | Age: 39
End: 2019-05-23
Payer: COMMERCIAL

## 2019-05-23 VITALS
SYSTOLIC BLOOD PRESSURE: 130 MMHG | BODY MASS INDEX: 30 KG/M2 | DIASTOLIC BLOOD PRESSURE: 82 MMHG | WEIGHT: 163 LBS | HEIGHT: 62 IN

## 2019-05-23 NOTE — PROGRESS NOTES
Bernice Billings is a 38 y.o. female here for a non-provider visit for blood pressure check.    If abnormal was an in office provider notified today (if so, indicate provider)? No  Routed to PCP? Yes

## 2019-09-19 ENCOUNTER — HOSPITAL ENCOUNTER (OUTPATIENT)
Dept: LAB | Facility: MEDICAL CENTER | Age: 39
End: 2019-09-19
Attending: NURSE PRACTITIONER
Payer: COMMERCIAL

## 2019-09-19 DIAGNOSIS — I10 ESSENTIAL HYPERTENSION: ICD-10-CM

## 2019-09-19 DIAGNOSIS — R53.83 TIREDNESS: ICD-10-CM

## 2019-09-19 LAB
25(OH)D3 SERPL-MCNC: 12 NG/ML (ref 30–100)
ALBUMIN SERPL BCP-MCNC: 4.4 G/DL (ref 3.2–4.9)
ALBUMIN/GLOB SERPL: 1.1 G/DL
ALP SERPL-CCNC: 93 U/L (ref 30–99)
ALT SERPL-CCNC: 18 U/L (ref 2–50)
ANION GAP SERPL CALC-SCNC: 10 MMOL/L (ref 0–11.9)
AST SERPL-CCNC: 19 U/L (ref 12–45)
BASOPHILS # BLD AUTO: 0.8 % (ref 0–1.8)
BASOPHILS # BLD: 0.08 K/UL (ref 0–0.12)
BILIRUB SERPL-MCNC: 0.4 MG/DL (ref 0.1–1.5)
BUN SERPL-MCNC: 10 MG/DL (ref 8–22)
CALCIUM SERPL-MCNC: 9.4 MG/DL (ref 8.5–10.5)
CHLORIDE SERPL-SCNC: 100 MMOL/L (ref 96–112)
CHOLEST SERPL-MCNC: 261 MG/DL (ref 100–199)
CO2 SERPL-SCNC: 25 MMOL/L (ref 20–33)
CREAT SERPL-MCNC: 0.41 MG/DL (ref 0.5–1.4)
EOSINOPHIL # BLD AUTO: 0.4 K/UL (ref 0–0.51)
EOSINOPHIL NFR BLD: 3.9 % (ref 0–6.9)
ERYTHROCYTE [DISTWIDTH] IN BLOOD BY AUTOMATED COUNT: 43.2 FL (ref 35.9–50)
FASTING STATUS PATIENT QL REPORTED: NORMAL
GLOBULIN SER CALC-MCNC: 3.9 G/DL (ref 1.9–3.5)
GLUCOSE SERPL-MCNC: 85 MG/DL (ref 65–99)
HCT VFR BLD AUTO: 43.9 % (ref 37–47)
HDLC SERPL-MCNC: 54 MG/DL
HGB BLD-MCNC: 13.9 G/DL (ref 12–16)
IMM GRANULOCYTES # BLD AUTO: 0.06 K/UL (ref 0–0.11)
IMM GRANULOCYTES NFR BLD AUTO: 0.6 % (ref 0–0.9)
LDLC SERPL CALC-MCNC: 166 MG/DL
LYMPHOCYTES # BLD AUTO: 1.76 K/UL (ref 1–4.8)
LYMPHOCYTES NFR BLD: 17.4 % (ref 22–41)
MCH RBC QN AUTO: 28.7 PG (ref 27–33)
MCHC RBC AUTO-ENTMCNC: 31.7 G/DL (ref 33.6–35)
MCV RBC AUTO: 90.5 FL (ref 81.4–97.8)
MONOCYTES # BLD AUTO: 0.46 K/UL (ref 0–0.85)
MONOCYTES NFR BLD AUTO: 4.5 % (ref 0–13.4)
NEUTROPHILS # BLD AUTO: 7.37 K/UL (ref 2–7.15)
NEUTROPHILS NFR BLD: 72.8 % (ref 44–72)
NRBC # BLD AUTO: 0 K/UL
NRBC BLD-RTO: 0 /100 WBC
PLATELET # BLD AUTO: 373 K/UL (ref 164–446)
PMV BLD AUTO: 10.9 FL (ref 9–12.9)
POTASSIUM SERPL-SCNC: 3.8 MMOL/L (ref 3.6–5.5)
PROT SERPL-MCNC: 8.3 G/DL (ref 6–8.2)
RBC # BLD AUTO: 4.85 M/UL (ref 4.2–5.4)
SODIUM SERPL-SCNC: 135 MMOL/L (ref 135–145)
TRIGL SERPL-MCNC: 206 MG/DL (ref 0–149)
TSH SERPL DL<=0.005 MIU/L-ACNC: 0.85 UIU/ML (ref 0.38–5.33)
VIT B12 SERPL-MCNC: 514 PG/ML (ref 211–911)
WBC # BLD AUTO: 10.1 K/UL (ref 4.8–10.8)

## 2019-09-19 PROCEDURE — 85025 COMPLETE CBC W/AUTO DIFF WBC: CPT

## 2019-09-19 PROCEDURE — 84443 ASSAY THYROID STIM HORMONE: CPT

## 2019-09-19 PROCEDURE — 80053 COMPREHEN METABOLIC PANEL: CPT

## 2019-09-19 PROCEDURE — 36415 COLL VENOUS BLD VENIPUNCTURE: CPT

## 2019-09-19 PROCEDURE — 82607 VITAMIN B-12: CPT

## 2019-09-19 PROCEDURE — 82306 VITAMIN D 25 HYDROXY: CPT

## 2019-09-19 PROCEDURE — 80061 LIPID PANEL: CPT

## 2019-10-01 ENCOUNTER — TELEPHONE (OUTPATIENT)
Dept: MEDICAL GROUP | Facility: PHYSICIAN GROUP | Age: 39
End: 2019-10-01

## 2019-10-01 ENCOUNTER — OFFICE VISIT (OUTPATIENT)
Dept: MEDICAL GROUP | Facility: PHYSICIAN GROUP | Age: 39
End: 2019-10-01
Payer: COMMERCIAL

## 2019-10-01 VITALS
OXYGEN SATURATION: 99 % | RESPIRATION RATE: 12 BRPM | HEIGHT: 61 IN | BODY MASS INDEX: 31.34 KG/M2 | DIASTOLIC BLOOD PRESSURE: 102 MMHG | SYSTOLIC BLOOD PRESSURE: 150 MMHG | HEART RATE: 85 BPM | TEMPERATURE: 98.5 F | WEIGHT: 166 LBS

## 2019-10-01 DIAGNOSIS — E66.9 OBESITY (BMI 30-39.9): ICD-10-CM

## 2019-10-01 DIAGNOSIS — F17.200 NICOTINE DEPENDENCE WITH CURRENT USE: ICD-10-CM

## 2019-10-01 DIAGNOSIS — E78.5 HYPERLIPIDEMIA, UNSPECIFIED HYPERLIPIDEMIA TYPE: ICD-10-CM

## 2019-10-01 DIAGNOSIS — I10 ESSENTIAL HYPERTENSION: ICD-10-CM

## 2019-10-01 DIAGNOSIS — E55.9 VITAMIN D DEFICIENCY: ICD-10-CM

## 2019-10-01 PROCEDURE — 99214 OFFICE O/P EST MOD 30 MIN: CPT | Performed by: NURSE PRACTITIONER

## 2019-10-01 RX ORDER — LOSARTAN POTASSIUM AND HYDROCHLOROTHIAZIDE 12.5; 5 MG/1; MG/1
1 TABLET ORAL DAILY
Qty: 90 TAB | Refills: 3 | Status: SHIPPED | OUTPATIENT
Start: 2019-10-01 | End: 2020-11-09

## 2019-10-01 RX ORDER — ATORVASTATIN CALCIUM 20 MG/1
20 TABLET, FILM COATED ORAL DAILY
Qty: 90 TAB | Refills: 3 | Status: SHIPPED | OUTPATIENT
Start: 2019-10-01 | End: 2020-04-09 | Stop reason: SDUPTHER

## 2019-10-01 RX ORDER — AZELASTINE 1 MG/ML
SPRAY, METERED NASAL
Refills: 5 | COMMUNITY
Start: 2019-08-19 | End: 2022-01-10

## 2019-10-01 RX ORDER — ERGOCALCIFEROL 1.25 MG/1
50000 CAPSULE ORAL
Qty: 12 CAP | Refills: 1 | Status: SHIPPED | OUTPATIENT
Start: 2019-10-01 | End: 2020-04-09 | Stop reason: SDUPTHER

## 2019-10-01 ASSESSMENT — PAIN SCALES - GENERAL: PAINLEVEL: NO PAIN

## 2019-10-01 NOTE — PATIENT INSTRUCTIONS
Will have you start atorvastatin and high dose weekly vitamin D    Labs in 3 months, then see me in 4 months      Preventing High Cholesterol  Cholesterol is a waxy, fat-like substance that your body needs in small amounts. Your liver makes all the cholesterol that your body needs. Having high cholesterol (hypercholesterolemia) increases your risk for heart disease and stroke. Extra (excess) cholesterol comes from the food you eat, such as animal-based fat (saturated fat) from meat and some dairy products.  High cholesterol can often be prevented with diet and lifestyle changes. If you already have high cholesterol, you can control it with diet and lifestyle changes, as well as medicine.  What nutrition changes can be made?  · Eat less saturated fat. Foods that contain saturated fat include red meat and some dairy products.  · Avoid processed meats, like chavarria and lunch meats.  · Avoid trans fats, which are found in margarine and some baked goods.  · Avoid foods and beverages that have added sugars.  · Eat more fruits, vegetables, and whole grains.  · Choose healthy sources of protein, such as fish, poultry, and nuts.  · Choose healthy sources of fat, such as:  ¨ Nuts.  ¨ Vegetable oils, especially olive oil.  ¨ Fish that have healthy fats (omega-3 fatty acids), such as mackerel or salmon.  What lifestyle changes can be made?  · Lose weight if you are overweight. Losing 5-10 lb (2.3-4.5 kg) can help prevent or control high cholesterol and reduce your risk for diabetes and high blood pressure. Ask your health care provider to help you with a diet and exercise plan to safely lose weight.  · Get enough exercise. Do at least 150 minutes of moderate-intensity exercise each week.  ¨ You could do this in short exercise sessions several times a day, or you could do longer exercise sessions a few times a week. For example, you could take a brisk 10-minute walk or bike ride, 3 times a day, for 5 days a week.  · Do not smoke.  If you need help quitting, ask your health care provider.  · Limit your alcohol intake. If you drink alcohol, limit alcohol intake to no more than 1 drink a day for nonpregnant women and 2 drinks a day for men. One drink equals 12 oz of beer, 5 oz of wine, or 1½ oz of hard liquor.  Why are these changes important?  If you have high cholesterol, deposits (plaques) may build up on the walls of your blood vessels. Plaques make the arteries narrower and stiffer, which can restrict or block blood flow and cause blood clots to form. This greatly increases your risk for heart attack and stroke. Making diet and lifestyle changes can reduce your risk for these life-threatening conditions.  What can I do to lower my risk?  · Manage your risk factors for high cholesterol. Talk with your health care provider about all of your risk factors and how to lower your risk.  · Manage other conditions that you have, such as diabetes or high blood pressure (hypertension).  · Have your cholesterol checked at regular intervals.  · Keep all follow-up visits as told by your health care provider. This is important.  How is this treated?  In addition to diet and lifestyle changes, your health care provider may recommend medicines to help lower cholesterol, such as a medicine to reduce the amount of cholesterol made in your liver. You may need medicine if:  · Diet and lifestyle changes do not lower your cholesterol enough.  · You have high cholesterol and other risk factors for heart disease or stroke.  Take over-the-counter and prescription medicines only as told by your health care provider.  Where to find more information:  · American Heart Association: www.heart.org/HEARTORG/Conditions/Cholesterol/Cholesterol_UC_001089_SubHomePage.jsp  · National Heart, Lung, and Blood [employer]: www.nhlbi.nih.gov/health/resources/heart/heart-cholesterol-hbc-what-html  Summary  · High cholesterol increases your risk for heart disease and stroke. By keeping  your cholesterol level low, you can reduce your risk for these conditions.  · Diet and lifestyle changes are the most important steps in preventing high cholesterol.  · Work with your health care provider to manage your risk factors, and have your blood tested regularly.  This information is not intended to replace advice given to you by your health care provider. Make sure you discuss any questions you have with your health care provider.  Document Released: 01/01/2017 Document Revised: 08/26/2017 Document Reviewed: 08/26/2017  Elsevier Interactive Patient Education © 2017 [employer]

## 2019-10-01 NOTE — PROGRESS NOTES
Chief Complaint   Patient presents with   • Hypertension     Lab Fv          This is a 38 y.o.female patient that presents today with the following: follow up labs, HTN    Hyperlipidemia  The 10-year ASCVD risk score (Love JUAN DAVID Jr., et al., 2013) is: 7.4%  Patient does agree to starting statin, LDL is 166.  She will start atorvastatin 20 mg daily, discussed with her the risks, benefits and side effects of medication.  Also discussed the importance of lifestyle modifications and she was given printed educational material on preventing high cholesterol.  She is going to return in 3 months with labs done before visit.    Hypertension  This is a chronic condition, suboptimally controlled on current dose of losartan, takes 50 mg daily.  Blood pressure today 150s over 100s, denies symptoms of hypertension.  She does agree to stopping losartan and starting losartan hydrochlorothiazide 50-12.5 mg daily.  She is going to return in 2 weeks for blood pressure check.  Discussed with her the risks, benefits and side effects medication.  She is otherwise going to follow-up with me in February with labs done before visit.    Nicotine dependence with current use  Patient does continue to smoke on a daily basis and is working on quitting on her own.  She does understand the risks associated with ongoing tobacco use especially in the setting of her comorbid conditions.    Obesity (BMI 30-39.9)  This is a chronic condition, weight is 166 pounds, BMI is 31.37.  She does understand the risks associated with her weight especially in the setting of her comorbid conditions and she continues to work on this.    Vitamin D deficiency  Patient does have vitamin D deficiency, she does agree to starting a vitamin D supplement at 50,000 units weekly, she is going to also take a daily supplement.  She will follow-up with me in 3 months with labs and before visit.      Hospital Outpatient Visit on 09/19/2019   Component Date Value   • Vitamin B12  -True Cobala* 09/19/2019 514    • 25-Hydroxy   Vitamin D 25 09/19/2019 12*   • TSH 09/19/2019 0.850    • Cholesterol,Tot 09/19/2019 261*   • Triglycerides 09/19/2019 206*   • HDL 09/19/2019 54    • LDL 09/19/2019 166*   • Sodium 09/19/2019 135    • Potassium 09/19/2019 3.8    • Chloride 09/19/2019 100    • Co2 09/19/2019 25    • Anion Gap 09/19/2019 10.0    • Glucose 09/19/2019 85    • Bun 09/19/2019 10    • Creatinine 09/19/2019 0.41*   • Calcium 09/19/2019 9.4    • AST(SGOT) 09/19/2019 19    • ALT(SGPT) 09/19/2019 18    • Alkaline Phosphatase 09/19/2019 93    • Total Bilirubin 09/19/2019 0.4    • Albumin 09/19/2019 4.4    • Total Protein 09/19/2019 8.3*   • Globulin 09/19/2019 3.9*   • A-G Ratio 09/19/2019 1.1    • WBC 09/19/2019 10.1    • RBC 09/19/2019 4.85    • Hemoglobin 09/19/2019 13.9    • Hematocrit 09/19/2019 43.9    • MCV 09/19/2019 90.5    • MCH 09/19/2019 28.7    • MCHC 09/19/2019 31.7*   • RDW 09/19/2019 43.2    • Platelet Count 09/19/2019 373    • MPV 09/19/2019 10.9    • Neutrophils-Polys 09/19/2019 72.80*   • Lymphocytes 09/19/2019 17.40*   • Monocytes 09/19/2019 4.50    • Eosinophils 09/19/2019 3.90    • Basophils 09/19/2019 0.80    • Immature Granulocytes 09/19/2019 0.60    • Nucleated RBC 09/19/2019 0.00    • Neutrophils (Absolute) 09/19/2019 7.37*   • Lymphs (Absolute) 09/19/2019 1.76    • Monos (Absolute) 09/19/2019 0.46    • Eos (Absolute) 09/19/2019 0.40    • Baso (Absolute) 09/19/2019 0.08    • Immature Granulocytes (a* 09/19/2019 0.06    • NRBC (Absolute) 09/19/2019 0.00    • Fasting Status 09/19/2019 Fasting    • GFR If  09/19/2019 >60    • GFR If Non  Ameri* 09/19/2019 >60          clinical course has been stable    Past Medical History:   Diagnosis Date   • Allergic rhinitis due to pollen 6/24/2015   • Asthma    • Diabetes (HCC)     gestational only   • Hives 1/6/2015   • Hyperlipidemia 2/11/2015   • Hypertension 1/6/2015   • Onychomycosis 6/24/2015       Past  Surgical History:   Procedure Laterality Date   • PELVISCOPY N/A 2/8/2018    Procedure: PELVISCOPY;  Surgeon: Alina Cherry M.D.;  Location: SURGERY SAME DAY [facility];  Service: Gynecology   • SALPINGECTOMY Bilateral 2/8/2018    Procedure: SALPINGECTOMY;  Surgeon: Alina Cherry M.D.;  Location: SURGERY SAME DAY [facility];  Service: Gynecology       Family History   Problem Relation Age of Onset   • Hypertension Mother    • Diabetes Father        Shellfish allergy    Current Outpatient Medications Ordered in Epic   Medication Sig Dispense Refill   • azelastine (ASTELIN) 137 MCG/SPRAY nasal spray USE 2 SPRAYS IN EACH NOSTRIL 2 TIMES A DAY AS NEEDED FOR NASAL SYMPTOMS-STOP IF NOSE BLEEDS OCCUR  5   • atorvastatin (LIPITOR) 20 MG Tab Take 1 Tab by mouth every day. 90 Tab 3   • ergocalciferol (DRISDOL) 96944 UNIT capsule Take 1 Cap by mouth every 7 days. 12 Cap 1   • losartan-hydrochlorothiazide (HYZAAR) 50-12.5 MG per tablet Take 1 Tab by mouth every day. 90 Tab 3   • BREO ELLIPTA 200-25 MCG/INH AEROSOL POWDER, BREATH ACTIVATED INHALE 1 PUFF BY MOUTH DAILY RINSE AND GARGLE AFTER USE  1   • Multiple Vitamin (MULTI-VITAMIN PO) Take  by mouth every day.     • loratadine (CLARITIN) 10 MG Tab Take 10 mg by mouth every day.     • Albuterol Sulfate (PROAIR HFA INH) Inhale  by mouth as needed.       No current Epic-ordered facility-administered medications on file.        Constitutional ROS: No unexpected change in weight, No weakness, No unexplained fevers, sweats, or chills  Pulmonary ROS: No chronic cough, sputum, or hemoptysis, No shortness of breath, No recent change in breathing, Positive for smoker   Cardiovascular ROS: No chest pain, No edema, No palpitations, Positive for hypertension and hyperlipidemia  Gastrointestinal ROS: No abdominal pain, No nausea, vomiting, diarrhea, or constipation  Musculoskeletal/Extremities ROS: No clubbing, No peripheral edema, No pain, redness or swelling on the joints  Neurologic  "ROS: Normal development, No seizures, No weakness    Physical exam:  /102 (BP Location: Left arm, Patient Position: Sitting, BP Cuff Size: Adult)   Pulse 85   Temp 36.9 °C (98.5 °F) (Temporal)   Resp 12   Ht 1.549 m (5' 1\")   Wt 75.3 kg (166 lb)   LMP 09/10/2019   SpO2 99%   BMI 31.37 kg/m²   General Appearance: pleasant middle aged female, alert, no distress, obese, well groomed  Skin: Skin color, texture, turgor normal. No rashes or lesions.  Lungs: negative findings: normal respiratory rate and rhythm, lungs clear to auscultation  Heart: negative. RRR without murmur, gallop, or rubs.  No ectopy.  Abdomen: Abdomen soft, non-tender. BS normal. No masses,  No organomegaly  Musculoskeletal: negative findings: no evidence of joint instability, no evidence of muscle atrophy, no deformities present  Neurologic: intact, CN 2-12 grossly intact     Medical decision making/discussion: Patient is going to stop losartan and will start losartan hydrochlorothiazide, she is to follow-up in 2 weeks for blood pressure check.  She also agrees to starting atorvastatin 20 mg daily.  She will start high-dose vitamin D and follow-up in 3 months with labs.  She will see me again in 4 months.  Discussed the importance of lifestyle modification and was given printed education material on preventing high cholesterol.  She is also going to work on regular physical activity and continued efforts towards weight loss.    Bernice was seen today for hypertension.    Diagnoses and all orders for this visit:    Essential hypertension  -     Comp Metabolic Panel; Future  -     losartan-hydrochlorothiazide (HYZAAR) 50-12.5 MG per tablet; Take 1 Tab by mouth every day.    Hyperlipidemia, unspecified hyperlipidemia type  -     atorvastatin (LIPITOR) 20 MG Tab; Take 1 Tab by mouth every day.  -     Lipid Profile; Future  -     Comp Metabolic Panel; Future    Vitamin D deficiency  -     VITAMIN D,25 HYDROXY; Future  -     ergocalciferol " (DRISDOL) 06031 UNIT capsule; Take 1 Cap by mouth every 7 days.    Nicotine dependence with current use    Obesity (BMI 30-39.9)  -     Patient identified as having weight management issue.  Appropriate orders and counseling given.        Return in about 4 months (around 2/1/2020) for Follow-up, Discuss Labs.        Please note that this dictation was created using voice recognition software. I have made every reasonable attempt to correct obvious errors, but I expect that there are errors of grammar and possibly content that I did not discover before finalizing the note.

## 2019-10-01 NOTE — ASSESSMENT & PLAN NOTE
The 10-year ASCVD risk score (Love FALL Jr., et al., 2013) is: 7.4%  Patient does agree to starting statin, LDL is 166.  She will start atorvastatin 20 mg daily, discussed with her the risks, benefits and side effects of medication.  Also discussed the importance of lifestyle modifications and she was given printed educational material on preventing high cholesterol.  She is going to return in 3 months with labs done before visit.

## 2019-10-01 NOTE — TELEPHONE ENCOUNTER
Please call patient and let her know that I would like her to follow-up in 2 weeks for a blood pressure check with MA since she will be starting a new blood pressure medication

## 2019-10-02 NOTE — ASSESSMENT & PLAN NOTE
This is a chronic condition, suboptimally controlled on current dose of losartan, takes 50 mg daily.  Blood pressure today 150s over 100s, denies symptoms of hypertension.  She does agree to stopping losartan and starting losartan hydrochlorothiazide 50-12.5 mg daily.  She is going to return in 2 weeks for blood pressure check.  Discussed with her the risks, benefits and side effects medication.  She is otherwise going to follow-up with me in February with labs done before visit.

## 2019-10-02 NOTE — ASSESSMENT & PLAN NOTE
Patient does have vitamin D deficiency, she does agree to starting a vitamin D supplement at 50,000 units weekly, she is going to also take a daily supplement.  She will follow-up with me in 3 months with labs and before visit.

## 2019-10-02 NOTE — TELEPHONE ENCOUNTER
Phone Number Called: [phone] (home)       Call outcome: left message for patient to call back regarding message below    Message: left voice message for patient to schedule a non provider visit for BP in 2 weeks.

## 2019-10-02 NOTE — ASSESSMENT & PLAN NOTE
Patient does continue to smoke on a daily basis and is working on quitting on her own.  She does understand the risks associated with ongoing tobacco use especially in the setting of her comorbid conditions.

## 2019-10-02 NOTE — ASSESSMENT & PLAN NOTE
This is a chronic condition, weight is 166 pounds, BMI is 31.37.  She does understand the risks associated with her weight especially in the setting of her comorbid conditions and she continues to work on this.

## 2019-10-04 NOTE — TELEPHONE ENCOUNTER
Phone Number Called: [phone] (home)       Call outcome: left message for patient to call back regarding message below    Message: left voice message for patient to call [phone] to schedule BP check.

## 2019-10-15 ENCOUNTER — NON-PROVIDER VISIT (OUTPATIENT)
Dept: MEDICAL GROUP | Facility: PHYSICIAN GROUP | Age: 39
End: 2019-10-15
Payer: COMMERCIAL

## 2019-10-15 VITALS
TEMPERATURE: 98 F | HEIGHT: 62 IN | WEIGHT: 169 LBS | DIASTOLIC BLOOD PRESSURE: 100 MMHG | RESPIRATION RATE: 16 BRPM | BODY MASS INDEX: 31.1 KG/M2 | SYSTOLIC BLOOD PRESSURE: 140 MMHG | OXYGEN SATURATION: 97 % | HEART RATE: 105 BPM

## 2019-10-15 DIAGNOSIS — I10 ESSENTIAL HYPERTENSION: ICD-10-CM

## 2019-10-15 PROCEDURE — 99213 OFFICE O/P EST LOW 20 MIN: CPT | Performed by: NURSE PRACTITIONER

## 2019-10-15 NOTE — PROGRESS NOTES
Chief Complaint   Patient presents with   • Other     blood pressure check has been on new medication for 1 week.  she said she feels better       HISTORY OF PRESENT ILLNESS: Patient is a 39 y.o. female established patient of Katty Christianson. who presents today to follow up on hypertension.         Hypertension  Patient has been taking Hyzaar 50/12.5 since 10/5/19. She has not noticed any side effects. She is taking the medication in the morning. She is not monitoring her blood pressure at home. She is drinking one cup of coffee in the morning, she is watching her intake of salt and avoiding alcohol. She has a mostly sitting job. She is not able to exercise now. She works from 5 AM to 2 PM and has an 18 month old at home.       Patient Active Problem List    Diagnosis Date Noted   • Postoperative pain 02/08/2018     Priority: High     Class: Acute   • Vitamin D deficiency 10/01/2019   • Obesity (BMI 30-39.9) 10/01/2019   • Nicotine dependence with current use 05/13/2019   • Allergic rhinitis due to pollen 06/24/2015   • Onychomycosis 06/24/2015   • Hyperlipidemia 02/11/2015   • Pre-conception counseling 02/11/2015   • Hypertension 01/06/2015   • Hives 01/06/2015       Allergies:Shellfish allergy    Current Outpatient Medications   Medication Sig Dispense Refill   • azelastine (ASTELIN) 137 MCG/SPRAY nasal spray USE 2 SPRAYS IN EACH NOSTRIL 2 TIMES A DAY AS NEEDED FOR NASAL SYMPTOMS-STOP IF NOSE BLEEDS OCCUR  5   • atorvastatin (LIPITOR) 20 MG Tab Take 1 Tab by mouth every day. 90 Tab 3   • ergocalciferol (DRISDOL) 91284 UNIT capsule Take 1 Cap by mouth every 7 days. 12 Cap 1   • losartan-hydrochlorothiazide (HYZAAR) 50-12.5 MG per tablet Take 1 Tab by mouth every day. 90 Tab 3   • BREO ELLIPTA 200-25 MCG/INH AEROSOL POWDER, BREATH ACTIVATED INHALE 1 PUFF BY MOUTH DAILY RINSE AND GARGLE AFTER USE  1   • Multiple Vitamin (MULTI-VITAMIN PO) Take  by mouth every day.     • Albuterol Sulfate (PROAIR HFA INH) Inhale  by  "mouth as needed.     • loratadine (CLARITIN) 10 MG Tab Take 10 mg by mouth every day.       No current facility-administered medications for this visit.        Social History     Tobacco Use   • Smoking status: Current Some Day Smoker     Packs/day: 0.25     Years: 25.00     Pack years: 6.25   • Smokeless tobacco: Never Used   Substance Use Topics   • Alcohol use: Yes     Alcohol/week: 1.0 oz     Types: 2 Shots of liquor per week     Comment: 3 per week    • Drug use: No       Family Status   Relation Name Status   • Mo  Alive   • Fa  Alive   • Bro  Alive   • MGMo     • MGFa     • PGMo     • PGFa     • Bro  Alive   • Fior  Alive     Family History   Problem Relation Age of Onset   • Hypertension Mother    • Diabetes Father        ROS:  Review of Systems   Constitutional: Negative for fever, chills,  HENT: Negative for ear pain, nosebleeds, congestion, sore throat and neck pain.    Eyes: Negative for blurred vision.   Respiratory: Negative for cough  Cardiovascular: Negative for chest pain, palpitations,   Exam:  /100   Pulse (!) 105   Temp 36.7 °C (98 °F) (Temporal)   Resp 16   Ht 1.575 m (5' 2\")   Wt 76.7 kg (169 lb)   SpO2 97%   General:  Well nourished, well developed female in NAD  Head is grossly normal.  Cardiovascular: Regular rate and rhythm without murmur.   Blood pressure after exam is 130/92, pulse is 90    Please note that this dictation was created using voice recognition software. I have made every reasonable attempt to correct obvious errors, but I expect that there are errors of grammar and possibly content that I did not discover before finalizing the note.    Assessment/Plan:  1. Essential hypertension   dietary and exercise recommendations, patient will continue current medication she will begin taking it at bedtime, she will follow-up for a medical assistant visit in 1 week to recheck her blood pressure.  We discussed if her blood pressure continues to " be elevated she may need an increase in her medication.  She is encouraged to begin to try to do some exercise during the day even if it is walking for 10 minutes at lunch and in the evening.  She will try decaffeinated coffee and make sure to watch her salt intake.

## 2019-10-15 NOTE — ASSESSMENT & PLAN NOTE
Patient has been taking Hyzaar 50/12.5 since 10/5/19. She has not noticed any side effects. She is taking the medication in the morning. She is not monitoring her blood pressure at home. She is drinking one cup of coffee in the morning, she is watching her intake of salt and avoiding alcohol. She has a mostly sitting job. She is not able to exercise now. She works from 5 AM to 2 PM and has an 18 month old at home.

## 2019-10-24 ENCOUNTER — NON-PROVIDER VISIT (OUTPATIENT)
Dept: MEDICAL GROUP | Facility: PHYSICIAN GROUP | Age: 39
End: 2019-10-24
Payer: COMMERCIAL

## 2019-10-24 VITALS
SYSTOLIC BLOOD PRESSURE: 138 MMHG | HEIGHT: 62 IN | DIASTOLIC BLOOD PRESSURE: 78 MMHG | WEIGHT: 169 LBS | BODY MASS INDEX: 31.1 KG/M2

## 2020-04-01 ENCOUNTER — HOSPITAL ENCOUNTER (OUTPATIENT)
Dept: LAB | Facility: MEDICAL CENTER | Age: 40
End: 2020-04-01
Attending: NURSE PRACTITIONER
Payer: COMMERCIAL

## 2020-04-01 DIAGNOSIS — E78.5 HYPERLIPIDEMIA: ICD-10-CM

## 2020-04-01 DIAGNOSIS — I10 ESSENTIAL HYPERTENSION: ICD-10-CM

## 2020-04-01 DIAGNOSIS — E55.9 VITAMIN D DEFICIENCY: ICD-10-CM

## 2020-04-01 LAB
25(OH)D3 SERPL-MCNC: 21 NG/ML (ref 30–100)
ALBUMIN SERPL BCP-MCNC: 4.4 G/DL (ref 3.2–4.9)
ALBUMIN/GLOB SERPL: 1.3 G/DL
ALP SERPL-CCNC: 70 U/L (ref 30–99)
ALT SERPL-CCNC: 18 U/L (ref 2–50)
ANION GAP SERPL CALC-SCNC: 14 MMOL/L (ref 7–16)
AST SERPL-CCNC: 15 U/L (ref 12–45)
BILIRUB SERPL-MCNC: 0.3 MG/DL (ref 0.1–1.5)
BUN SERPL-MCNC: 12 MG/DL (ref 8–22)
CALCIUM SERPL-MCNC: 9.1 MG/DL (ref 8.5–10.5)
CHLORIDE SERPL-SCNC: 99 MMOL/L (ref 96–112)
CHOLEST SERPL-MCNC: 243 MG/DL (ref 100–199)
CO2 SERPL-SCNC: 23 MMOL/L (ref 20–33)
CREAT SERPL-MCNC: 0.52 MG/DL (ref 0.5–1.4)
FASTING STATUS PATIENT QL REPORTED: NORMAL
GLOBULIN SER CALC-MCNC: 3.3 G/DL (ref 1.9–3.5)
GLUCOSE SERPL-MCNC: 100 MG/DL (ref 65–99)
HDLC SERPL-MCNC: 50 MG/DL
LDLC SERPL CALC-MCNC: 150 MG/DL
POTASSIUM SERPL-SCNC: 4.3 MMOL/L (ref 3.6–5.5)
PROT SERPL-MCNC: 7.7 G/DL (ref 6–8.2)
SODIUM SERPL-SCNC: 136 MMOL/L (ref 135–145)
TRIGL SERPL-MCNC: 213 MG/DL (ref 0–149)

## 2020-04-01 PROCEDURE — 80061 LIPID PANEL: CPT

## 2020-04-01 PROCEDURE — 80053 COMPREHEN METABOLIC PANEL: CPT

## 2020-04-01 PROCEDURE — 82306 VITAMIN D 25 HYDROXY: CPT

## 2020-04-01 PROCEDURE — 36415 COLL VENOUS BLD VENIPUNCTURE: CPT

## 2020-04-09 ENCOUNTER — TELEMEDICINE (OUTPATIENT)
Dept: MEDICAL GROUP | Facility: PHYSICIAN GROUP | Age: 40
End: 2020-04-09
Payer: COMMERCIAL

## 2020-04-09 DIAGNOSIS — E78.5 HYPERLIPIDEMIA, UNSPECIFIED HYPERLIPIDEMIA TYPE: ICD-10-CM

## 2020-04-09 DIAGNOSIS — E55.9 VITAMIN D DEFICIENCY: ICD-10-CM

## 2020-04-09 PROCEDURE — 99214 OFFICE O/P EST MOD 30 MIN: CPT | Mod: 95,CR | Performed by: NURSE PRACTITIONER

## 2020-04-09 RX ORDER — ERGOCALCIFEROL 1.25 MG/1
50000 CAPSULE ORAL
Qty: 12 CAP | Refills: 1 | Status: SHIPPED | OUTPATIENT
Start: 2020-04-09 | End: 2020-09-16

## 2020-04-09 RX ORDER — ATORVASTATIN CALCIUM 20 MG/1
20 TABLET, FILM COATED ORAL DAILY
Qty: 90 TAB | Refills: 3 | Status: SHIPPED | OUTPATIENT
Start: 2020-04-09 | End: 2021-03-22

## 2020-04-09 NOTE — PROGRESS NOTES
Telemedicine Visit: Established Patient     This encounter was conducted via Zoom .   Verbal consent was obtained. Patient's identity was verified.    Subjective:   CC:   Bernice Billings is a 39 y.o. female presenting for evaluation and management of:    Allergies, medication refill    Hyperlipidemia  The 10-year ASCVD risk score (Love FALL Jr., et al., 2013) is: 6.6%  Pt on atorvastatin, due for labs. Discussed the importance of lifestyle modifications. Does need refills, this was called in for her    Vitamin D deficiency  Patient has history of vitamin D deficiency, due for labs, these were called in for her.  She currently takes 50,000 units once a week.        ROS   Denies any recent fevers or chills. No nausea or vomiting. No chest pains or shortness of breath.     Allergies   Allergen Reactions   • Shellfish Allergy      Tested positive on allergy test, and has hives with SHRIMP only       Current medicines (including changes today)  Current Outpatient Medications   Medication Sig Dispense Refill   • atorvastatin (LIPITOR) 20 MG Tab Take 1 Tab by mouth every day. 90 Tab 3   • ergocalciferol (DRISDOL) 85664 UNIT capsule Take 1 Cap by mouth every 7 days. 12 Cap 1   • azelastine (ASTELIN) 137 MCG/SPRAY nasal spray USE 2 SPRAYS IN EACH NOSTRIL 2 TIMES A DAY AS NEEDED FOR NASAL SYMPTOMS-STOP IF NOSE BLEEDS OCCUR  5   • losartan-hydrochlorothiazide (HYZAAR) 50-12.5 MG per tablet Take 1 Tab by mouth every day. 90 Tab 3   • BREO ELLIPTA 200-25 MCG/INH AEROSOL POWDER, BREATH ACTIVATED INHALE 1 PUFF BY MOUTH DAILY RINSE AND GARGLE AFTER USE  1   • Multiple Vitamin (MULTI-VITAMIN PO) Take  by mouth every day.     • Albuterol Sulfate (PROAIR HFA INH) Inhale  by mouth as needed.     • loratadine (CLARITIN) 10 MG Tab Take 10 mg by mouth every day.       No current facility-administered medications for this visit.        Patient Active Problem List    Diagnosis Date Noted   • Postoperative pain 02/08/2018     Priority: High      Class: Acute   • Vitamin D deficiency 10/01/2019   • Obesity (BMI 30-39.9) 10/01/2019   • Nicotine dependence with current use 05/13/2019   • Allergic rhinitis due to pollen 06/24/2015   • Onychomycosis 06/24/2015   • Hyperlipidemia 02/11/2015   • Pre-conception counseling 02/11/2015   • Hypertension 01/06/2015   • Hives 01/06/2015       Family History   Problem Relation Age of Onset   • Hypertension Mother    • Diabetes Father        She  has a past medical history of Allergic rhinitis due to pollen (6/24/2015), Asthma, Diabetes (HCC), Hives (1/6/2015), Hyperlipidemia (2/11/2015), Hypertension (1/6/2015), and Onychomycosis (6/24/2015).  She  has a past surgical history that includes pelviscopy (N/A, 2/8/2018) and salpingectomy (Bilateral, 2/8/2018).       Objective:   Vitals obtained by patient:    There were no vitals taken for this visit.      Physical Exam:  Constitutional: Alert, no distress, well-groomed.  Skin: No rashes in visible areas.  Eye: Round. Conjunctiva clear, lids normal. No icterus.   ENMT: Lips pink without lesions, good dentition, moist mucous membranes. Phonation normal.  Neck: No masses, no thyromegaly. Moves freely without pain.  CV: Pulse as reported by patient  Respiratory: Unlabored respiratory effort, no cough or audible wheeze  Psych: Alert and oriented x3, normal affect and mood.       Assessment and Plan:   The following treatment plan was discussed:     Consider taking flonase, OTC 1 spray to each nostril twice day    meds refilled    Follow up in 4 months with labs before visit    1. Hyperlipidemia, unspecified hyperlipidemia type  - atorvastatin (LIPITOR) 20 MG Tab; Take 1 Tab by mouth every day.  Dispense: 90 Tab; Refill: 3  - Lipid Profile; Future    2. Vitamin D deficiency  - ergocalciferol (DRISDOL) 01983 UNIT capsule; Take 1 Cap by mouth every 7 days.  Dispense: 12 Cap; Refill: 1  - VITAMIN D,25 HYDROXY; Future        Follow-up: Return in about 4 months (around 8/9/2020)  for Follow-up, Discuss Labs.

## 2020-04-09 NOTE — PATIENT INSTRUCTIONS
Consider taking flonase, OTC 1 spray to each nostril twice day    meds refilled    Follow up in 4 months with labs before visitall

## 2020-04-14 NOTE — ASSESSMENT & PLAN NOTE
Patient has history of vitamin D deficiency, due for labs, these were called in for her.  She currently takes 50,000 units once a week.

## 2020-04-14 NOTE — ASSESSMENT & PLAN NOTE
The 10-year ASCVD risk score (Love FALL Jr., et al., 2013) is: 6.6%  Pt on atorvastatin, due for labs. Discussed the importance of lifestyle modifications. Does need refills, this was called in for her

## 2020-09-15 DIAGNOSIS — E55.9 VITAMIN D DEFICIENCY: ICD-10-CM

## 2020-09-16 RX ORDER — ERGOCALCIFEROL 1.25 MG/1
CAPSULE ORAL
Qty: 4 CAP | Refills: 0 | Status: SHIPPED | OUTPATIENT
Start: 2020-09-16 | End: 2020-11-10

## 2020-09-16 NOTE — TELEPHONE ENCOUNTER
Last seen by PCP 04/9/2020.  [facility] vitamin D lab done 4/1/2020 (21 ng/ml)   Patient given a 6 month therapy and was to recheck labs.    Will send 1 month(s) to the pharmacy. Pt to make appt and get labs prior to more refills.

## 2020-10-10 ENCOUNTER — HOSPITAL ENCOUNTER (OUTPATIENT)
Dept: LAB | Facility: MEDICAL CENTER | Age: 40
End: 2020-10-10
Attending: NURSE PRACTITIONER
Payer: COMMERCIAL

## 2020-10-10 DIAGNOSIS — E55.9 VITAMIN D DEFICIENCY: ICD-10-CM

## 2020-10-10 DIAGNOSIS — E78.5 HYPERLIPIDEMIA, UNSPECIFIED HYPERLIPIDEMIA TYPE: ICD-10-CM

## 2020-10-10 LAB
25(OH)D3 SERPL-MCNC: 39 NG/ML (ref 30–100)
CHOLEST SERPL-MCNC: 185 MG/DL (ref 100–199)
FASTING STATUS PATIENT QL REPORTED: NORMAL
HDLC SERPL-MCNC: 46 MG/DL
LDLC SERPL CALC-MCNC: 67 MG/DL
TRIGL SERPL-MCNC: 361 MG/DL (ref 0–149)

## 2020-10-10 PROCEDURE — 80061 LIPID PANEL: CPT

## 2020-10-10 PROCEDURE — 36415 COLL VENOUS BLD VENIPUNCTURE: CPT

## 2020-10-10 PROCEDURE — 82306 VITAMIN D 25 HYDROXY: CPT

## 2020-10-22 ENCOUNTER — OFFICE VISIT (OUTPATIENT)
Dept: MEDICAL GROUP | Facility: PHYSICIAN GROUP | Age: 40
End: 2020-10-22
Payer: COMMERCIAL

## 2020-10-22 VITALS
OXYGEN SATURATION: 97 % | TEMPERATURE: 97.9 F | RESPIRATION RATE: 16 BRPM | SYSTOLIC BLOOD PRESSURE: 130 MMHG | DIASTOLIC BLOOD PRESSURE: 80 MMHG | WEIGHT: 173.1 LBS | HEIGHT: 62 IN | BODY MASS INDEX: 31.86 KG/M2 | HEART RATE: 96 BPM

## 2020-10-22 DIAGNOSIS — Z12.31 ENCOUNTER FOR SCREENING MAMMOGRAM FOR MALIGNANT NEOPLASM OF BREAST: ICD-10-CM

## 2020-10-22 DIAGNOSIS — E78.5 HYPERLIPIDEMIA, UNSPECIFIED HYPERLIPIDEMIA TYPE: ICD-10-CM

## 2020-10-22 DIAGNOSIS — F43.21 SITUATIONAL DEPRESSION: ICD-10-CM

## 2020-10-22 DIAGNOSIS — M25.532 BILATERAL WRIST PAIN: ICD-10-CM

## 2020-10-22 DIAGNOSIS — M25.531 BILATERAL WRIST PAIN: ICD-10-CM

## 2020-10-22 DIAGNOSIS — I10 ESSENTIAL HYPERTENSION: ICD-10-CM

## 2020-10-22 PROCEDURE — 99214 OFFICE O/P EST MOD 30 MIN: CPT | Performed by: NURSE PRACTITIONER

## 2020-10-22 RX ORDER — FENOFIBRATE 48 MG/1
48 TABLET, COATED ORAL DAILY
Qty: 90 TAB | Refills: 3 | Status: SHIPPED | OUTPATIENT
Start: 2020-10-22 | End: 2021-04-06 | Stop reason: SDUPTHER

## 2020-10-22 ASSESSMENT — PATIENT HEALTH QUESTIONNAIRE - PHQ9
SUM OF ALL RESPONSES TO PHQ QUESTIONS 1-9: 5
CLINICAL INTERPRETATION OF PHQ2 SCORE: 2
5. POOR APPETITE OR OVEREATING: 2 - MORE THAN HALF THE DAYS

## 2020-10-22 ASSESSMENT — FIBROSIS 4 INDEX: FIB4 SCORE: 0.4

## 2020-10-22 NOTE — ASSESSMENT & PLAN NOTE
Patient has bilateral wrist pain, left greater than right  She has previously been told that she has likely carpal tunnel, has been implementing supportive measures to include bilateral stabilizing wrist splints but this does not seem to improve her symptoms  She would like further evaluation to include nerve conduction study, this has been ordered  She understands depending on the results she may need to be referred to hand specialist  Encouraged to continue with supportive measures

## 2020-10-22 NOTE — PROGRESS NOTES
Chief Complaint   Patient presents with   • Lab Results         This is a 40 y.o.female patient that presents today with the following: Follow-up, review labs    Bilateral wrist pain  Patient has bilateral wrist pain, left greater than right  She has previously been told that she has likely carpal tunnel, has been implementing supportive measures to include bilateral stabilizing wrist splints but this does not seem to improve her symptoms  She would like further evaluation to include nerve conduction study, this has been ordered  She understands depending on the results she may need to be referred to hand specialist  Encouraged to continue with supportive measures    Hyperlipidemia  The 10-year ASCVD risk score (Love FALL Jr., et al., 2013) is: 4.1%  Patient is on statin, she takes atorvastatin 20 mg daily  While her total cholesterol and HDL have improved her triglycerides have actually increased from the mid 200s to 350  We discussed the increased risk of developing pancreatitis in the setting of hypertriglyceridemia  She does agree to starting fenofibrate 48 mg in addition to a atorvastatin  Discussed the importance of avoiding excessive saturated fats in the diet  Patient will follow up in 4 months with labs done before visit    Hypertension  Is a chronic condition, stable and well-controlled on current medications including losartan hydrochlorothiazide  Blood pressure today within normal limits  Up-to-date with labs  Does not need refills on meds at this time      Hospital Outpatient Visit on 10/10/2020   Component Date Value   • 25-Hydroxy   Vitamin D 25 10/10/2020 39    • Cholesterol,Tot 10/10/2020 185    • Triglycerides 10/10/2020 361*   • HDL 10/10/2020 46    • LDL 10/10/2020 67    • Fasting Status 10/10/2020 Fasting          clinical course has been stable    Past Medical History:   Diagnosis Date   • Allergic rhinitis due to pollen 6/24/2015   • Asthma    • Diabetes (HCC)     gestational only   • Hives  1/6/2015   • Hyperlipidemia 2/11/2015   • Hypertension 1/6/2015   • Onychomycosis 6/24/2015       Past Surgical History:   Procedure Laterality Date   • PELVISCOPY N/A 2/8/2018    Procedure: PELVISCOPY;  Surgeon: Alina Cherry M.D.;  Location: SURGERY SAME DAY [facility];  Service: Gynecology   • SALPINGECTOMY Bilateral 2/8/2018    Procedure: SALPINGECTOMY;  Surgeon: Alina Cherry M.D.;  Location: SURGERY SAME DAY [facility];  Service: Gynecology       Family History   Problem Relation Age of Onset   • Hypertension Mother    • Diabetes Father        Shellfish allergy    Current Outpatient Medications Ordered in Epic   Medication Sig Dispense Refill   • fenofibrate (TRICOR) 48 MG Tab Take 1 Tab by mouth every day. 90 Tab 3   • vitamin D, Ergocalciferol, (DRISDOL) 1.25 MG (23320 UT) Cap capsule TAKE 1 CAPSULE EVERY 7 DAYS 4 Cap 0   • atorvastatin (LIPITOR) 20 MG Tab Take 1 Tab by mouth every day. 90 Tab 3   • azelastine (ASTELIN) 137 MCG/SPRAY nasal spray USE 2 SPRAYS IN EACH NOSTRIL 2 TIMES A DAY AS NEEDED FOR NASAL SYMPTOMS-STOP IF NOSE BLEEDS OCCUR  5   • losartan-hydrochlorothiazide (HYZAAR) 50-12.5 MG per tablet Take 1 Tab by mouth every day. 90 Tab 3   • BREO ELLIPTA 200-25 MCG/INH AEROSOL POWDER, BREATH ACTIVATED INHALE 1 PUFF BY MOUTH DAILY RINSE AND GARGLE AFTER USE  1   • Multiple Vitamin (MULTI-VITAMIN PO) Take  by mouth every day.     • Albuterol Sulfate (PROAIR HFA INH) Inhale  by mouth as needed.     • loratadine (CLARITIN) 10 MG Tab Take 10 mg by mouth every day.       No current [facility]-ordered facility-administered medications on file.        Constitutional ROS: No unexpected change in weight, No weakness, No unexplained fevers, sweats, or chills  Pulmonary ROS: No chronic cough, sputum, or hemoptysis, No shortness of breath, No recent change in breathing.  Positive for daily smoker  Cardiovascular ROS: No chest pain, No edema, No palpitations, Positive for hypertension,  "hyperlipidemia  Gastrointestinal ROS: No abdominal pain, No nausea, vomiting, diarrhea, or constipation  Musculoskeletal/Extremities ROS: Positive per HPI  Neurologic ROS: Normal development, No seizures, No weakness  Psychiatric ROS: Positive for situational depression    Physical exam:  /80   Pulse 96   Temp 36.6 °C (97.9 °F) (Temporal)   Resp 16   Ht 1.575 m (5' 2\")   Wt 78.5 kg (173 lb 1.6 oz)   SpO2 97%   BMI 31.66 kg/m²   General Appearance: Pleasant middle-aged female, alert, no distress, obese, well-groomed  Skin: Skin color, texture, turgor normal. No rashes or lesions.  Lungs: negative findings: normal respiratory rate and rhythm, lungs clear to auscultation  Heart: negative. RRR without murmur, gallop, or rubs.  No ectopy.  Abdomen: Abdomen soft, non-tender. BS normal. No masses,  No organomegaly  Musculoskeletal: negative findings: no evidence of joint instability, no evidence of muscle atrophy, no deformities present  Neurologic: intact, CN II through XII grossly intact    Medical decision making/discussion:     Will have you start fenofibrate along with atorvastatin    Follow up in 4 months with labs    Nerve conduction study    mammogram    Bernice was seen today for lab results.    Diagnoses and all orders for this visit:    Hyperlipidemia, unspecified hyperlipidemia type  -     fenofibrate (TRICOR) 48 MG Tab; Take 1 Tab by mouth every day.  -     Lipid Profile; Future  -     Comp Metabolic Panel; Future    Essential hypertension    Situational depression    Bilateral wrist pain  -     REFERRAL TO NEURODIAGNOSTICS (EEG,EP,EMG/NCS/DBS) Modality Requested: EMG/NCS-Comment Extremities    Encounter for screening mammogram for malignant neoplasm of breast  -     MA-SCREENING MAMMO BILAT W/CAD; Future        Return in about 4 months (around 2/22/2021) for Discuss Labs, Medication Refill.        Please note that this dictation was created using voice recognition software. I have made every " reasonable attempt to correct obvious errors, but I expect that there are errors of grammar and possibly content that I did not discover before finalizing the note.

## 2020-10-22 NOTE — PATIENT INSTRUCTIONS
Will have you start fenofibrate along with atorvastatin    Follow up in 4 months with labs    Nerve conduction study    mammogram

## 2020-10-22 NOTE — ASSESSMENT & PLAN NOTE
Is a chronic condition, stable and well-controlled on current medications including losartan hydrochlorothiazide  Blood pressure today within normal limits  Up-to-date with labs  Does not need refills on meds at this time

## 2020-10-22 NOTE — ASSESSMENT & PLAN NOTE
The 10-year ASCVD risk score (Love FALL Jr., et al., 2013) is: 4.1%  Patient is on statin, she takes atorvastatin 20 mg daily  While her total cholesterol and HDL have improved her triglycerides have actually increased from the mid 200s to 350  We discussed the increased risk of developing pancreatitis in the setting of hypertriglyceridemia  She does agree to starting fenofibrate 48 mg in addition to a atorvastatin  Discussed the importance of avoiding excessive saturated fats in the diet  Patient will follow up in 4 months with labs done before visit

## 2020-11-05 DIAGNOSIS — I10 ESSENTIAL HYPERTENSION: ICD-10-CM

## 2020-11-08 DIAGNOSIS — E55.9 VITAMIN D DEFICIENCY: ICD-10-CM

## 2020-11-09 RX ORDER — LOSARTAN POTASSIUM AND HYDROCHLOROTHIAZIDE 12.5; 5 MG/1; MG/1
TABLET ORAL
Qty: 90 TAB | Refills: 1 | Status: SHIPPED | OUTPATIENT
Start: 2020-11-09 | End: 2021-05-03

## 2020-11-09 NOTE — TELEPHONE ENCOUNTER
Refill X 6 months, sent to pharmacy.Pt. Seen in the last 6 months per protocol.   Lab Results   Component Value Date/Time    SODIUM 136 04/01/2020 06:24 AM    POTASSIUM 4.3 04/01/2020 06:24 AM    CHLORIDE 99 04/01/2020 06:24 AM    CO2 23 04/01/2020 06:24 AM    GLUCOSE 100 (H) 04/01/2020 06:24 AM    BUN 12 04/01/2020 06:24 AM    CREATININE 0.52 04/01/2020 06:24 AM    CREATININE 0.5 11/21/2007 10:40 AM

## 2020-11-10 RX ORDER — ERGOCALCIFEROL 1.25 MG/1
50000 CAPSULE ORAL
Qty: 12 CAP | Refills: 3 | Status: SHIPPED | OUTPATIENT
Start: 2020-11-10 | End: 2021-09-23 | Stop reason: SDUPTHER

## 2020-11-19 ENCOUNTER — NON-PROVIDER VISIT (OUTPATIENT)
Dept: NEUROLOGY | Facility: MEDICAL CENTER | Age: 40
End: 2020-11-19
Payer: COMMERCIAL

## 2020-11-19 DIAGNOSIS — M25.531 BILATERAL WRIST PAIN: ICD-10-CM

## 2020-11-19 DIAGNOSIS — M25.532 BILATERAL WRIST PAIN: ICD-10-CM

## 2020-11-19 PROCEDURE — 95913 NRV CNDJ TEST 13/> STUDIES: CPT | Performed by: PSYCHIATRY & NEUROLOGY

## 2020-11-19 PROCEDURE — 99999 PR NO CHARGE: CPT | Performed by: PSYCHIATRY & NEUROLOGY

## 2020-11-19 PROCEDURE — 95886 MUSC TEST DONE W/N TEST COMP: CPT | Performed by: PSYCHIATRY & NEUROLOGY

## 2020-11-19 NOTE — PROCEDURES
"NERVE CONDUCTION STUDIES AND ELECTROMYOGRAPHY REPORT  [facility]  11/19/2020           IMPRESSION:  This is a normal electrodiagnostic study.  There is no evidence of median/ulnar neuropathy, brachial plexopathy, or C5-T1 cervical radiculopathy in the bilateral upper extremities.  Consider repeat testing if progressive symptoms.      Sari Keys MD  Neurology - Neurophysiology  [facility]          REASON FOR REFERRAL:  Ms. Bernice Billings 40 y.o. referred by DAT Westfall for evaluation of pain and tingling in the bilateral upper extremities.  Patient had symptoms of carpal tunnel over 10 years ago during pregnancy which has since resolved.  Most recently in the last year she started developing tingling in the hands as well as pain.  The symptoms have been worsening in the last 2 months and are intermittent in nature.    Height: 5'2\"  Weight: 170 lbs    Symptom focused neurological exam shows normal strength and sensation to light touch in the bilateral upper extremities.      ELECTRODIAGNOSTIC EXAMINATION:  Nerve conduction studies (NCS) and electromyography (EMG) are utilized to evaluate direct or indirect damage to the peripheral nervous system. NCS are performed to measure the nerve(s) response(s) to electrostimulation across a given nerve segment. EMG evaluates the passive and active electrical activity of the muscle(s) in question.  Muscles are innervated by specific peripheral nerves and roots. Often times, several nerves the muscle to be examined in order to determine the presence or absence of the disease process. Furthermore, nerves and muscles may need to be tested in a [id number] comparison, as well as in additional extremities, as this may be crucial in characterizing the extent of the disease process, which may be diffuse or isolated and of varying degree of severity. The extent of the neurodiagnostic exam is justified as it may help arrive to a proper " diagnosis, which ultimately may contribute to better management of the patient. Therefore, the nerves to muscles examined during the study were medically necessary.    Unless otherwise noted, temperature of the extremity(s) study was monitored before and during the examination and remained between 32 and 36 degrees C for the upper extremities, and between 30 and 36 degrees C for the lower extremities. The patient tolerated testing well, without any complications.       NERVE CONDUCTION STUDY SUMMARY:  Selected nerves of the bilateral upper extremity are studied.    Normal bilateral median and ulnar sensory and motor responses.  Normal bilateral radial sensory responses.    NEEDLE EMG SUMMARY:  Concentric needle study of selected bilateral upper extremity muscles is performed.     Insertion activity is normal in all muscles sampled.   With activation, there are normal morphology (amplitude/duration) motor unit action potentials firing with normal recruitment in muscles tested.       PATIENT DATA TABLES  Nerve Conduction Studies     Stim Site NR Onset (ms) Norm Onset (ms) O-P Amp (µV) Norm O-P Amp Site1 Site2 Delta-P (ms) Dist (cm) Justin (m/s) Norm Justin (m/s)   Left Median Anti Sensory (2nd Digit)   Wrist    1.9 <3.4 41.4 >20 Wrist 2nd Digit 2.8 14.0 50 >50   Right Median Anti Sensory (2nd Digit)   Wrist    2.1 <3.4 37.3 >20 Wrist 2nd Digit 2.8 14.0 50 >50   Left Radial Anti Sensory (Base 1st Digit)   Wrist    1.6 <2.7 35.4 >18 Wrist Base 1st Digit 2.0 10.0 50 >50   Right Radial Anti Sensory (Base 1st Digit)   Wrist    1.5 <2.7 30.4 >18 Wrist Base 1st Digit 2.0 10.0 50 >50   Left Ulnar Anti Sensory (5th Digit)   Wrist    2.0 <3.1 27.6 >12 Wrist 5th Digit 2.8 14.0 50 >50   Right Ulnar Anti Sensory (5th Digit)   Wrist    2.2 <3.1 19.4 >12 Wrist 5th Digit 2.8 14.0 50 >50        Stim Site NR Onset (ms) Norm Onset (ms) O-P Amp (mV) Norm O-P Amp Site1 Site2 Delta-0 (ms) Dist (cm) Justin (m/s) Norm Justin (m/s)   Left Median Motor  (Abd Poll Brev)   Wrist    2.4 <3.9 12.5 >6 Elbow Wrist 3.7 22.0 59 >50   Elbow    6.1  11.0          Right Median Motor (Abd Poll Brev)   Wrist    2.7 <3.9 15.5 >6 Elbow Wrist 3.8 23.0 61 >50   Elbow    6.5  14.1          Left Ulnar Motor (Abd Dig Min)   Wrist    2.3 <3.1 8.2 >7 B Elbow Wrist 3.2 18.0 56 >50   B Elbow    5.5  7.7  A Elbow B Elbow 1.5 10.0 67    A Elbow    7.0  7.5          Right Ulnar Motor (Abd Dig Min)   Wrist    2.3 <3.1 9.4 >7 B Elbow Wrist 2.7 18.0 67 >50   B Elbow    5.0  9.2  A Elbow B Elbow 1.4 10.0 71    A Elbow    6.4  8.9               Stim Site NR Peak (ms) Norm Peak (ms) P-T Amp (µV) Site1 Site2 Delta-P (ms) Norm Delta (ms)   Left Median/Ulnar Palm Comparison (Wrist - 8cm)   Median Palm    1.6 <2.3 26.9 Median Palm Ulnar Palm 0.1 <0.3   Ulnar Palm    1.5 <2.3 50.1       Right Median/Ulnar Palm Comparison (Wrist - 8cm)   Median Palm    1.8 <2.3 55.0 Median Palm Ulnar Palm 0.0 <0.3   Ulnar Palm    1.8 <2.3 14.9                                     Electromyography     Side Muscle Nerve Root Ins Act Fibs Psw Amp Dur Poly Recrt Int Pat Comment   Left 1stDorInt Ulnar C8-T1 Nml Nml Nml Nml Nml 0 Nml Nml    Left PronatorTeres Median C6-7 Nml Nml Nml Nml Nml 0 Nml Nml    Left Biceps Musculocut C5-6 Nml Nml Nml Nml Nml 0 Nml Nml    Left Triceps Radial C6-7-8 Nml Nml Nml Nml Nml 0 Nml Nml    Left Deltoid Axillary C5-6 Nml Nml Nml Nml Nml 0 Nml Nml    Right 1stDorInt Ulnar C8-T1 Nml Nml Nml Nml Nml 0 Nml Nml    Right PronatorTeres Median C6-7 Nml Nml Nml Nml Nml 0 Nml Nml    Right Biceps Musculocut C5-6 Nml Nml Nml Nml Nml 0 Nml Nml    Right Triceps Radial C6-7-8 Nml Nml Nml Nml Nml 0 Nml Nml    Right Deltoid Axillary C5-6 Nml Nml Nml Nml Nml 0 Nml Nml

## 2020-12-17 ENCOUNTER — APPOINTMENT (OUTPATIENT)
Dept: RADIOLOGY | Facility: MEDICAL CENTER | Age: 40
End: 2020-12-17
Attending: NURSE PRACTITIONER
Payer: COMMERCIAL

## 2020-12-19 ENCOUNTER — HOSPITAL ENCOUNTER (OUTPATIENT)
Dept: RADIOLOGY | Facility: MEDICAL CENTER | Age: 40
End: 2020-12-19
Attending: NURSE PRACTITIONER
Payer: COMMERCIAL

## 2020-12-19 DIAGNOSIS — Z12.31 ENCOUNTER FOR SCREENING MAMMOGRAM FOR MALIGNANT NEOPLASM OF BREAST: ICD-10-CM

## 2020-12-19 PROCEDURE — 77067 SCR MAMMO BI INCL CAD: CPT

## 2021-03-11 ENCOUNTER — HOSPITAL ENCOUNTER (OUTPATIENT)
Dept: LAB | Facility: MEDICAL CENTER | Age: 41
End: 2021-03-11
Attending: NURSE PRACTITIONER
Payer: COMMERCIAL

## 2021-03-11 DIAGNOSIS — E78.5 HYPERLIPIDEMIA, UNSPECIFIED HYPERLIPIDEMIA TYPE: ICD-10-CM

## 2021-03-11 PROCEDURE — 80053 COMPREHEN METABOLIC PANEL: CPT

## 2021-03-11 PROCEDURE — 80061 LIPID PANEL: CPT

## 2021-03-11 PROCEDURE — 36415 COLL VENOUS BLD VENIPUNCTURE: CPT

## 2021-03-12 LAB
ALBUMIN SERPL BCP-MCNC: 4.6 G/DL (ref 3.2–4.9)
ALBUMIN/GLOB SERPL: 1.4 G/DL
ALP SERPL-CCNC: 65 U/L (ref 30–99)
ALT SERPL-CCNC: 27 U/L (ref 2–50)
ANION GAP SERPL CALC-SCNC: 12 MMOL/L (ref 7–16)
AST SERPL-CCNC: 27 U/L (ref 12–45)
BILIRUB SERPL-MCNC: 0.4 MG/DL (ref 0.1–1.5)
BUN SERPL-MCNC: 8 MG/DL (ref 8–22)
CALCIUM SERPL-MCNC: 9.8 MG/DL (ref 8.5–10.5)
CHLORIDE SERPL-SCNC: 99 MMOL/L (ref 96–112)
CHOLEST SERPL-MCNC: 184 MG/DL (ref 100–199)
CO2 SERPL-SCNC: 27 MMOL/L (ref 20–33)
CREAT SERPL-MCNC: 0.5 MG/DL (ref 0.5–1.4)
FASTING STATUS PATIENT QL REPORTED: NORMAL
GLOBULIN SER CALC-MCNC: 3.3 G/DL (ref 1.9–3.5)
GLUCOSE SERPL-MCNC: 100 MG/DL (ref 65–99)
HDLC SERPL-MCNC: 44 MG/DL
LDLC SERPL CALC-MCNC: 94 MG/DL
POTASSIUM SERPL-SCNC: 4.2 MMOL/L (ref 3.6–5.5)
PROT SERPL-MCNC: 7.9 G/DL (ref 6–8.2)
SODIUM SERPL-SCNC: 138 MMOL/L (ref 135–145)
TRIGL SERPL-MCNC: 229 MG/DL (ref 0–149)

## 2021-04-06 ENCOUNTER — OFFICE VISIT (OUTPATIENT)
Dept: MEDICAL GROUP | Facility: PHYSICIAN GROUP | Age: 41
End: 2021-04-06
Payer: COMMERCIAL

## 2021-04-06 VITALS
SYSTOLIC BLOOD PRESSURE: 128 MMHG | DIASTOLIC BLOOD PRESSURE: 80 MMHG | BODY MASS INDEX: 30.91 KG/M2 | OXYGEN SATURATION: 97 % | HEART RATE: 78 BPM | WEIGHT: 168 LBS | TEMPERATURE: 98.1 F | HEIGHT: 62 IN | RESPIRATION RATE: 12 BRPM

## 2021-04-06 DIAGNOSIS — I10 ESSENTIAL HYPERTENSION: ICD-10-CM

## 2021-04-06 DIAGNOSIS — F17.200 NICOTINE DEPENDENCE WITH CURRENT USE: ICD-10-CM

## 2021-04-06 DIAGNOSIS — E78.5 HYPERLIPIDEMIA, UNSPECIFIED HYPERLIPIDEMIA TYPE: ICD-10-CM

## 2021-04-06 DIAGNOSIS — M25.531 BILATERAL WRIST PAIN: ICD-10-CM

## 2021-04-06 DIAGNOSIS — E55.9 VITAMIN D DEFICIENCY: ICD-10-CM

## 2021-04-06 DIAGNOSIS — E66.9 OBESITY (BMI 30-39.9): ICD-10-CM

## 2021-04-06 DIAGNOSIS — M25.532 BILATERAL WRIST PAIN: ICD-10-CM

## 2021-04-06 PROCEDURE — 99214 OFFICE O/P EST MOD 30 MIN: CPT | Performed by: NURSE PRACTITIONER

## 2021-04-06 RX ORDER — FENOFIBRATE 48 MG/1
48 TABLET, COATED ORAL DAILY
Qty: 90 TABLET | Refills: 3 | Status: SHIPPED | OUTPATIENT
Start: 2021-04-06 | End: 2022-03-10 | Stop reason: SDUPTHER

## 2021-04-06 ASSESSMENT — FIBROSIS 4 INDEX: FIB4 SCORE: 0.59

## 2021-04-06 ASSESSMENT — PATIENT HEALTH QUESTIONNAIRE - PHQ9: CLINICAL INTERPRETATION OF PHQ2 SCORE: 0

## 2021-04-06 NOTE — ASSESSMENT & PLAN NOTE
Patient did follow-up with neurology for her bilateral wrist pain.  Patient reports that she does not have any carpal tunnel.    Current pain in left wrist is 1 out of 10 right wrist is 0 out of 10.  Patient will occasionally take Tylenol for pain relief.

## 2021-04-06 NOTE — ASSESSMENT & PLAN NOTE
Patient expresses that her daughter was just diagnosed with diabetes at the age of 13 so she has changed her cooking habits at home to more healthier lifestyle choices.  Patient's last weight in October was 173 pounds she is currently 168 lbs.   Patient was congratulated on her weight loss!  Encouraged to continue low carb and calorie diet to encourage weight loss and healthy eating habits.

## 2021-04-06 NOTE — ASSESSMENT & PLAN NOTE
Patient is currently taking her losartan hydrochlorothiazide and denies any side effects to this medication.  Patient denies any dizziness lightheadedness with position changes.  Blood pressures well controlled at 128/80 no changes made in current plan.

## 2021-04-06 NOTE — ASSESSMENT & PLAN NOTE
The 10-year ASCVD risk score (Love FALL Jr., et al., 2013) is: 4.3%    Values used to calculate the score:      Age: 40 years      Sex: Female      Is Non- : No      Diabetic: No      Tobacco smoker: Yes      Systolic Blood Pressure: 128 mmHg      Is BP treated: Yes      HDL Cholesterol: 44 mg/dL      Total Cholesterol: 184 mg/dL    Lab Results   Component Value Date/Time    CHOLSTRLTOT 184 03/11/2021 07:29 AM    CHOLSTRLTOT 185 10/10/2020 09:37 AM    LDL 94 03/11/2021 07:29 AM    LDL 67 10/10/2020 09:37 AM    HDL 44 03/11/2021 07:29 AM    HDL 46 10/10/2020 09:37 AM    TRIGLYCERIDE 229 (H) 03/11/2021 07:29 AM    TRIGLYCERIDE 361 (H) 10/10/2020 09:37 AM     Reviewed labs and they have improved since last October.  Patient continues on her Lipitor and fenofibrate and denies any myalgia.    A refill was sent into [facility] for refills on her fenofibrate.    Educated on continuing healthy diet changes as well as lifestyle changes such as exercise.

## 2021-04-06 NOTE — ASSESSMENT & PLAN NOTE
Patient does continue to smoke cigarettes.  Currently she indicates that she does not want to quit smoking.  Did  that there are new programs out there to help quit smoking as well as medications and that we are here for her and she would like to quit smoking.  We will continue to encourage smoking cessation

## 2021-04-06 NOTE — PROGRESS NOTES
Chief Complaint   Patient presents with   • Hyperlipidemia       HISTORY OF PRESENT ILLNESS: Patient is a 40 y.o. female established patient who presents today for the following:    Hypertension  Patient is currently taking her losartan hydrochlorothiazide and denies any side effects to this medication.  Patient denies any dizziness lightheadedness with position changes.  Blood pressures well controlled at 128/80 no changes made in current plan.    Hyperlipidemia  The 10-year ASCVD risk score (Love FALL Jr., et al., 2013) is: 4.3%    Values used to calculate the score:      Age: 40 years      Sex: Female      Is Non- : No      Diabetic: No      Tobacco smoker: Yes      Systolic Blood Pressure: 128 mmHg      Is BP treated: Yes      HDL Cholesterol: 44 mg/dL      Total Cholesterol: 184 mg/dL    Lab Results   Component Value Date/Time    CHOLSTRLTOT 184 03/11/2021 07:29 AM    CHOLSTRLTOT 185 10/10/2020 09:37 AM    LDL 94 03/11/2021 07:29 AM    LDL 67 10/10/2020 09:37 AM    HDL 44 03/11/2021 07:29 AM    HDL 46 10/10/2020 09:37 AM    TRIGLYCERIDE 229 (H) 03/11/2021 07:29 AM    TRIGLYCERIDE 361 (H) 10/10/2020 09:37 AM     Reviewed labs and they have improved since last October.  Patient continues on her Lipitor and fenofibrate and denies any myalgia.    A refill was sent into [facility] for refills on her fenofibrate.    Educated on continuing healthy diet changes as well as lifestyle changes such as exercise.    Obesity (BMI 30-39.9)  Patient expresses that her daughter was just diagnosed with diabetes at the age of 13 so she has changed her cooking habits at home to more healthier lifestyle choices.  Patient's last weight in October was 173 pounds she is currently 168 lbs.   Patient was congratulated on her weight loss!  Encouraged to continue low carb and calorie diet to encourage weight loss and healthy eating habits.    Bilateral wrist pain  Patient did follow-up with neurology for her  bilateral wrist pain.  Patient reports that she does not have any carpal tunnel.    Current pain in left wrist is 1 out of 10 right wrist is 0 out of 10.  Patient will occasionally take Tylenol for pain relief.      Nicotine dependence with current use  Patient does continue to smoke cigarettes.  Currently she indicates that she does not want to quit smoking.  Did  that there are new programs out there to help quit smoking as well as medications and that we are here for her and she would like to quit smoking.  We will continue to encourage smoking cessation      Patient Active Problem List    Diagnosis Date Noted   • Postoperative pain 02/08/2018   • Situational depression 10/22/2020   • Bilateral wrist pain 10/22/2020   • Vitamin D deficiency 10/01/2019   • Obesity (BMI 30-39.9) 10/01/2019   • Nicotine dependence with current use 05/13/2019   • Allergic rhinitis due to pollen 06/24/2015   • Onychomycosis 06/24/2015   • Hyperlipidemia 02/11/2015   • Pre-conception counseling 02/11/2015   • Hypertension 01/06/2015   • Hives 01/06/2015       Allergies:Shellfish allergy    Current Outpatient Medications   Medication Sig Dispense Refill   • fenofibrate (TRICOR) 48 MG Tab Take 1 tablet by mouth every day. 90 tablet 3   • atorvastatin (LIPITOR) 20 MG Tab TAKE 1 TABLET DAILY 90 tablet 3   • vitamin D, Ergocalciferol, (DRISDOL) 1.25 MG (34071 UT) Cap capsule Take 1 Cap by mouth every 7 days. 12 Cap 3   • losartan-hydrochlorothiazide (HYZAAR) 50-12.5 MG per tablet TAKE ONE TABLET BY MOUTH ONCE A DAY 90 Tab 1   • azelastine (ASTELIN) 137 MCG/SPRAY nasal spray USE 2 SPRAYS IN EACH NOSTRIL 2 TIMES A DAY AS NEEDED FOR NASAL SYMPTOMS-STOP IF NOSE BLEEDS OCCUR  5   • BREO ELLIPTA 200-25 MCG/INH AEROSOL POWDER, BREATH ACTIVATED INHALE 1 PUFF BY MOUTH DAILY RINSE AND GARGLE AFTER USE  1   • Multiple Vitamin (MULTI-VITAMIN PO) Take  by mouth every day.     • Albuterol Sulfate (PROAIR HFA INH) Inhale  by mouth as needed.     •  "loratadine (CLARITIN) 10 MG Tab Take 10 mg by mouth every day.       No current facility-administered medications for this visit.       Social History     Tobacco Use   • Smoking status: Current Some Day Smoker     Packs/day: 0.25     Years: 25.00     Pack years: 6.25   • Smokeless tobacco: Never Used   Substance Use Topics   • Alcohol use: Yes     Alcohol/week: 1.0 oz     Types: 2 Shots of liquor per week     Comment: 3 per week    • Drug use: No       Family Status   Relation Name Status   • Mo  Alive   • Fa  Alive   • Bro  Alive   • MGMo     • MGFa     • PGMo     • PGFa     • Bro  Alive   • Fior  Alive     Family History   Problem Relation Age of Onset   • Hypertension Mother    • Diabetes Father        Review of Systems:   Constitutional: Negative for fever, chills, weight loss and malaise/fatigue.   Respiratory: Negative for cough, sputum production, shortness of breath and wheezing.    Cardiovascular: Negative for chest pain, palpitations, orthopnea and leg swelling.   Gastrointestinal: Negative for heartburn, nausea, vomiting and abdominal pain.   Genitourinary/Renal: Negative for dysuria, urgency and frequency.   Musculoskeletal: Positive per HPI  Skin: Negative for rash and itching.   Neurological: Negative for dizziness, tingling, tremors, sensory change, focal weakness and headaches.   Endo/Heme/Allergies: Does not bruise/bleed easily.   Psychiatric/Behavioral: Negative for depression, suicidal ideas and memory loss.  The patient is not nervous/anxious and does not have insomnia.    All other systems reviewed and are negative except as in HPI.     Exam:   /80   Pulse 78   Temp 36.7 °C (98.1 °F) (Temporal)   Resp 12   Ht 1.575 m (5' 2\")   Wt 76.2 kg (168 lb)   SpO2 97%   General:  Well nourished, well developed female in NAD  Skin: warm, dry, intact, no evidence of rash or concerning lesions  Head: is grossly normal.  HEENT: eyes clear, conjunctiva normal, " PERRLA,  Neck: Supple without JVD or bruit. Thyroid is not enlarged.  Pulmonary: Clear to ausculation. Normal effort. No rales, ronchi, or wheezing.  Cardiovascular: Regular rate and rhythm without murmur. Carotid and radial pulses are intact and equal bilaterally.  Abdomen: soft, non-tender, positive bowel sounds  Musculoskeletal: no clubbing, cyanosis, or edema.  Psych/mental: no depression, anxiety, hallucinations  Neuro: alert, intact, CN 2-12 grossly intact    Medical decision-making and discussion:  Patient was seen today for follow-up labs after starting fenofibrate for elevated triglycerides.  Labs reviewed and have improved.  Patient denies any side effects from Lipitor or fenofibrate.  She was educated on myalgia symptoms.  Reviewed all medications.  Obesity and her current plan to reduce her weight healthy eating habits and exercise routine. Her bilateral wrist pain is well controlled with occasional Tylenol use.  She did follow-up with neurology and indicated no carpal tunnel at this time.        Assessment/Plan:   Bernice was seen today for hyperlipidemia.    Diagnoses and all orders for this visit:    Hyperlipidemia, unspecified hyperlipidemia type  Continue Lipitor and fenofibrate as prescribed.  Educated on appropriate diet.  -     fenofibrate (TRICOR) 48 MG Tab; Take 1 tablet by mouth every day.  -     Comp Metabolic Panel; Future  -     Lipid Profile; Future    Essential hypertension  Patient is well controlled on losartan/hydrochlorthiazide.  We will continue to monitor.  Patient was educated on healthy eating habits.    Obesity (BMI 30-39.9)  Discussed diet, exercise, weight loss, behavioral modification, portion size management.    -     Comp Metabolic Panel; Future    Bilateral wrist pain  Educated on appropriate use of Tylenol therapy for occasional pain relief.     Vitamin D deficiency  -     VITAMIN D,25 HYDROXY; Future         Return in about 1 year (around 4/6/2022) for Follow up  labs.    Please note that this dictation was created using voice recognition software. I have made every reasonable attempt to correct obvious errors, but I expect that there are errors of grammar and possibly content that I did not discover before finalizing the note.

## 2021-04-06 NOTE — PATIENT INSTRUCTIONS
Continue to practice healthy healthy eating habits as well as increasing your exercise.    Continue taking all medications as prescribed.  Follow-up with labs in 6 months    MyPlate from USDA    MyPlate is an outline of a general healthy diet based on the 2010 Dietary Guidelines for Americans, from the U.S. Department of Agriculture (USDA). It sets guidelines for how much food you should eat from each food group based on your age, sex, and level of physical activity.  What are tips for following MyPlate?  To follow MyPlate recommendations:  · Eat a wide variety of fruits and vegetables, grains, and protein foods.  · Serve smaller portions and eat less food throughout the day.  · Limit portion sizes to avoid overeating.  · Enjoy your food.  · Get at least 150 minutes of exercise every week. This is about 30 minutes each day, 5 or more days per week.  It can be difficult to have every meal look like MyPlate. Think about MyPlate as eating guidelines for an entire day, rather than each individual meal.  Fruits and vegetables  · Make half of your plate fruits and vegetables.  · Eat many different colors of fruits and vegetables each day.  · For a 2,000 calorie daily food plan, eat:  ? 2½ cups of vegetables every day.  ? 2 cups of fruit every day.  · 1 cup is equal to:  ? 1 cup raw or cooked vegetables.  ? 1 cup raw fruit.  ? 1 medium-sized orange, apple, or banana.  ? 1 cup 100% fruit or vegetable juice.  ? 2 cups raw leafy greens, such as lettuce, spinach, or kale.  ? ½ cup dried fruit.  Grains  · One fourth of your plate should be grains.  · Make at least half of the grains you eat each day whole grains.  · For a 2,000 calorie daily food plan, eat 6 oz of grains every day.  · 1 oz is equal to:  ? 1 slice bread.  ? 1 cup cereal.  ? ½ cup cooked rice, cereal, or pasta.  Protein  · One fourth of your plate should be protein.  · Eat a wide variety of protein foods, including meat, poultry, fish, eggs, beans, nuts, and  tofu.  · For a 2,000 calorie daily food plan, eat 5½ oz of protein every day.  · 1 oz is equal to:  ? 1 oz meat, poultry, or fish.  ? ¼ cup cooked beans.  ? 1 egg.  ? ½ oz nuts or seeds.  ? 1 Tbsp peanut butter.  Dairy  · Drink fat-free or low-fat (1%) milk.  · Eat or drink dairy as a side to meals.  · For a 2,000 calorie daily food plan, eat or drink 3 cups of dairy every day.  · 1 cup is equal to:  ? 1 cup milk, yogurt, cottage cheese, or soy milk (soy beverage).  ? 2 oz processed cheese.  ? 1½ oz natural cheese.  Fats, oils, salt, and sugars  · Only small amounts of oils are recommended.  · Avoid foods that are high in calories and low in nutritional value (empty calories), like foods high in fat or added sugars.  · Choose foods that are low in salt (sodium). Choose foods that have less than 140 milligrams (mg) of sodium per serving.  · Drink water instead of sugary drinks. Drink enough water each day to keep your urine pale yellow.  Where to find support  · Work with your health care provider or a nutrition specialist (dietitian) to develop a customized eating plan that is right for you.  · Download an ottoniel (mobile application) to help you track your daily food intake.  Where to find more information  · Go to ChooseMyPlate.gov for more information.  · Learn more and log your daily food intake according to MyPlate using USDA's SuperTracker: www.supertracker.usda.gov  Summary  · MyPlate is a general guideline for healthy eating from the USDA. It is based on the 2010 Dietary Guidelines for Americans.  · In general, fruits and vegetables should take up ½ of your plate, grains should take up ¼ of your plate, and protein should take up ¼ of your plate.  This information is not intended to replace advice given to you by your health care provider. Make sure you discuss any questions you have with your health care provider.  Document Released: 01/06/2009 Document Revised: 01/19/2019 Document Reviewed: 03/19/2018  Niyah  Patient Education © 2020 [employer].

## 2021-09-23 DIAGNOSIS — E55.9 VITAMIN D DEFICIENCY: ICD-10-CM

## 2021-09-23 RX ORDER — ERGOCALCIFEROL 1.25 MG/1
50000 CAPSULE ORAL
Qty: 4 CAPSULE | Refills: 0 | Status: SHIPPED | OUTPATIENT
Start: 2021-09-23 | End: 2022-03-15 | Stop reason: SDUPTHER

## 2021-09-23 NOTE — TELEPHONE ENCOUNTER
Received request via: Pharmacy    Was the patient seen in the last year in this department? Yes  4/6/21  Does the patient have an active prescription (recently filled or refills available) for medication(s) requested? No

## 2021-09-30 NOTE — TELEPHONE ENCOUNTER
I spoke with patient and advised her that Katty sent in a 30 day refill of the Vit D rx and reminded her to have labs done in the next week or two for more refills. She states understanding.

## 2021-12-04 ENCOUNTER — HOSPITAL ENCOUNTER (OUTPATIENT)
Dept: LAB | Facility: MEDICAL CENTER | Age: 41
End: 2021-12-04
Attending: NURSE PRACTITIONER
Payer: COMMERCIAL

## 2021-12-04 DIAGNOSIS — E78.5 HYPERLIPIDEMIA, UNSPECIFIED HYPERLIPIDEMIA TYPE: ICD-10-CM

## 2021-12-04 DIAGNOSIS — E55.9 VITAMIN D DEFICIENCY: ICD-10-CM

## 2021-12-04 DIAGNOSIS — E66.9 OBESITY (BMI 30-39.9): ICD-10-CM

## 2021-12-04 LAB
ALBUMIN SERPL BCP-MCNC: 4.4 G/DL (ref 3.2–4.9)
ALBUMIN/GLOB SERPL: 1.3 G/DL
ALP SERPL-CCNC: 59 U/L (ref 30–99)
ALT SERPL-CCNC: 23 U/L (ref 2–50)
ANION GAP SERPL CALC-SCNC: 12 MMOL/L (ref 7–16)
AST SERPL-CCNC: 20 U/L (ref 12–45)
BILIRUB SERPL-MCNC: 0.5 MG/DL (ref 0.1–1.5)
BUN SERPL-MCNC: 8 MG/DL (ref 8–22)
CALCIUM SERPL-MCNC: 9.3 MG/DL (ref 8.5–10.5)
CHLORIDE SERPL-SCNC: 102 MMOL/L (ref 96–112)
CHOLEST SERPL-MCNC: 168 MG/DL (ref 100–199)
CO2 SERPL-SCNC: 24 MMOL/L (ref 20–33)
CREAT SERPL-MCNC: 0.45 MG/DL (ref 0.5–1.4)
FASTING STATUS PATIENT QL REPORTED: NORMAL
GLOBULIN SER CALC-MCNC: 3.3 G/DL (ref 1.9–3.5)
GLUCOSE SERPL-MCNC: 115 MG/DL (ref 65–99)
HDLC SERPL-MCNC: 40 MG/DL
LDLC SERPL CALC-MCNC: 89 MG/DL
POTASSIUM SERPL-SCNC: 4.4 MMOL/L (ref 3.6–5.5)
PROT SERPL-MCNC: 7.7 G/DL (ref 6–8.2)
SODIUM SERPL-SCNC: 138 MMOL/L (ref 135–145)
TRIGL SERPL-MCNC: 194 MG/DL (ref 0–149)

## 2021-12-04 PROCEDURE — 82306 VITAMIN D 25 HYDROXY: CPT

## 2021-12-04 PROCEDURE — 36415 COLL VENOUS BLD VENIPUNCTURE: CPT

## 2021-12-04 PROCEDURE — 80061 LIPID PANEL: CPT

## 2021-12-04 PROCEDURE — 80053 COMPREHEN METABOLIC PANEL: CPT

## 2021-12-08 LAB — 25(OH)D3 SERPL-MCNC: 50 NG/ML (ref 30–80)

## 2021-12-18 ENCOUNTER — TELEPHONE (OUTPATIENT)
Dept: URGENT CARE | Facility: PHYSICIAN GROUP | Age: 41
End: 2021-12-18

## 2021-12-18 NOTE — TELEPHONE ENCOUNTER
----- Message from ANGEL Story sent at 12/8/2021 12:34 PM PST -----  Call patient and have her schedule for follow-up with me for lab review.

## 2022-01-03 ENCOUNTER — OFFICE VISIT (OUTPATIENT)
Dept: URGENT CARE | Facility: PHYSICIAN GROUP | Age: 42
End: 2022-01-03
Payer: COMMERCIAL

## 2022-01-03 ENCOUNTER — HOSPITAL ENCOUNTER (OUTPATIENT)
Facility: MEDICAL CENTER | Age: 42
End: 2022-01-03
Attending: PHYSICIAN ASSISTANT
Payer: COMMERCIAL

## 2022-01-03 VITALS
BODY MASS INDEX: 31.28 KG/M2 | HEART RATE: 101 BPM | SYSTOLIC BLOOD PRESSURE: 110 MMHG | WEIGHT: 170 LBS | DIASTOLIC BLOOD PRESSURE: 90 MMHG | OXYGEN SATURATION: 96 % | HEIGHT: 62 IN | RESPIRATION RATE: 18 BRPM | TEMPERATURE: 98 F

## 2022-01-03 DIAGNOSIS — R06.2 WHEEZE: ICD-10-CM

## 2022-01-03 DIAGNOSIS — R35.0 URINARY FREQUENCY: ICD-10-CM

## 2022-01-03 DIAGNOSIS — Z20.822 EXPOSURE TO CONFIRMED CASE OF COVID-19: ICD-10-CM

## 2022-01-03 LAB
APPEARANCE UR: CLEAR
BILIRUB UR STRIP-MCNC: NORMAL MG/DL
COLOR UR AUTO: YELLOW
GLUCOSE UR STRIP.AUTO-MCNC: NORMAL MG/DL
KETONES UR STRIP.AUTO-MCNC: NORMAL MG/DL
LEUKOCYTE ESTERASE UR QL STRIP.AUTO: NORMAL
NITRITE UR QL STRIP.AUTO: NORMAL
PH UR STRIP.AUTO: 6.5 [PH] (ref 5–8)
PROT UR QL STRIP: NORMAL MG/DL
RBC UR QL AUTO: NORMAL
SP GR UR STRIP.AUTO: 1.01
UROBILINOGEN UR STRIP-MCNC: 0.2 MG/DL

## 2022-01-03 PROCEDURE — U0003 INFECTIOUS AGENT DETECTION BY NUCLEIC ACID (DNA OR RNA); SEVERE ACUTE RESPIRATORY SYNDROME CORONAVIRUS 2 (SARS-COV-2) (CORONAVIRUS DISEASE [COVID-19]), AMPLIFIED PROBE TECHNIQUE, MAKING USE OF HIGH THROUGHPUT TECHNOLOGIES AS DESCRIBED BY CMS-2020-01-R: HCPCS

## 2022-01-03 PROCEDURE — U0005 INFEC AGEN DETEC AMPLI PROBE: HCPCS

## 2022-01-03 PROCEDURE — 81002 URINALYSIS NONAUTO W/O SCOPE: CPT | Performed by: PHYSICIAN ASSISTANT

## 2022-01-03 PROCEDURE — 99214 OFFICE O/P EST MOD 30 MIN: CPT | Mod: CS | Performed by: PHYSICIAN ASSISTANT

## 2022-01-03 RX ORDER — ALBUTEROL SULFATE 90 UG/1
AEROSOL, METERED RESPIRATORY (INHALATION)
COMMUNITY
Start: 2021-11-13 | End: 2022-01-10

## 2022-01-03 RX ORDER — FLUOROMETHOLONE 0.1 %
SUSPENSION, DROPS(FINAL DOSAGE FORM)(ML) OPHTHALMIC (EYE)
COMMUNITY
Start: 2021-11-24 | End: 2022-01-10

## 2022-01-03 RX ORDER — ALBUTEROL SULFATE 90 UG/1
2 AEROSOL, METERED RESPIRATORY (INHALATION) EVERY 4 HOURS PRN
Qty: 18.2 G | Refills: 0 | Status: SHIPPED | OUTPATIENT
Start: 2022-01-03

## 2022-01-03 NOTE — PROGRESS NOTES
Chief Complaint   Patient presents with   • Chills   • Body Aches   • Urinary Frequency     feels like has to go but nothing comes out       HISTORY OF PRESENT ILLNESS: Patient is a 41 y.o. female who presents today because she has a several day history of fever, chills, body aches, urinary frequency with low volumes, generalized malaise and fatigue.  She was recently exposed to a coworker who tested positive for Covid a few days ago.  She has been taking Tylenol for symptoms without improvement    Patient Active Problem List    Diagnosis Date Noted   • Situational depression 10/22/2020   • Bilateral wrist pain 10/22/2020   • Vitamin D deficiency 10/01/2019   • Obesity (BMI 30-39.9) 10/01/2019   • Nicotine dependence with current use 05/13/2019   • Postoperative pain 02/08/2018   • Allergic rhinitis due to pollen 06/24/2015   • Onychomycosis 06/24/2015   • Hyperlipidemia 02/11/2015   • Pre-conception counseling 02/11/2015   • Hypertension 01/06/2015   • Hives 01/06/2015       Allergies:Shellfish allergy    Current Outpatient Medications Ordered in Epic   Medication Sig Dispense Refill   • albuterol 108 (90 Base) MCG/ACT Aero Soln inhalation aerosol Inhale 2 Puffs every four hours as needed. With spacer device 18.2 g 0   • vitamin D2, Ergocalciferol, (DRISDOL) 1.25 MG (62672 UT) Cap capsule Take 1 Capsule by mouth every 7 days. 4 Capsule 0   • losartan-hydrochlorothiazide (HYZAAR) 50-12.5 MG per tablet TAKE ONE TABLET BY MOUTH ONCE A DAY 90 tablet 3   • fenofibrate (TRICOR) 48 MG Tab Take 1 tablet by mouth every day. 90 tablet 3   • atorvastatin (LIPITOR) 20 MG Tab TAKE 1 TABLET DAILY 90 tablet 3   • azelastine (ASTELIN) 137 MCG/SPRAY nasal spray USE 2 SPRAYS IN EACH NOSTRIL 2 TIMES A DAY AS NEEDED FOR NASAL SYMPTOMS-STOP IF NOSE BLEEDS OCCUR  5   • Multiple Vitamin (MULTI-VITAMIN PO) Take  by mouth every day.     • Albuterol Sulfate (PROAIR HFA INH) Inhale  by mouth as needed.     • loratadine (CLARITIN) 10 MG Tab  Take 10 mg by mouth every day.     • TRELEGY ELLIPTA 200-62.5-25 MCG/INH AEROSOL POWDER, BREATH ACTIVATED Inhale 200 mcg every day.     • albuterol 108 (90 Base) MCG/ACT Aero Soln inhalation aerosol      • fluorometholone (FML) 0.1 % Suspension INSTILL 1 DROP IN LEFT EYE FOUR TIMES DAILY FOR 7 DAYS     • BREO ELLIPTA 200-25 MCG/INH AEROSOL POWDER, BREATH ACTIVATED INHALE 1 PUFF BY MOUTH DAILY RINSE AND GARGLE AFTER USE (Patient not taking: Reported on 1/3/2022)  1     No current [facility]-ordered facility-administered medications on file.       Past Medical History:   Diagnosis Date   • Allergic rhinitis due to pollen 2015   • Asthma    • Diabetes (HCC)     gestational only   • Hives 2015   • Hyperlipidemia 2015   • Hypertension 2015   • Onychomycosis 2015       Social History     Tobacco Use   • Smoking status: Current Some Day Smoker     Packs/day: 0.25     Years: 25.00     Pack years: 6.25   • Smokeless tobacco: Never Used   Vaping Use   • Vaping Use: Never used   Substance Use Topics   • Alcohol use: Yes     Alcohol/week: 1.0 oz     Types: 2 Shots of liquor per week     Comment: 3 per week    • Drug use: No       Family Status   Relation Name Status   • Mo  Alive   • Fa  Alive   • Bro  Alive   • MGMo     • MGFa     • PGMo     • PGFa     • Bro  Alive   • Fior  Alive     Family History   Problem Relation Age of Onset   • Hypertension Mother    • Diabetes Father        ROS:  Review of Systems   Constitutional: Positive for fever, chills, myalgias and malaise/fatigue.   HENT: Negative for ear pain, nosebleeds, congestion, sore throat and neck pain.    Eyes: Negative for blurred vision.   Respiratory: Positive for cough, no sputum production, positive for shortness of breath and no wheezing.    Cardiovascular: Negative for chest pain, palpitations, orthopnea and leg swelling.   Gastrointestinal: Negative for heartburn, nausea, vomiting and abdominal pain.  "  Genitourinary: Negative for dysuria, positive for urgency and frequency.     Exam:  /90   Pulse (!) 101   Temp 36.7 °C (98 °F) (Temporal)   Resp 18   Ht 1.575 m (5' 2\")   Wt 77.1 kg (170 lb)   SpO2 96%   General:  Well nourished, well developed female in NAD  Head:Normocephalic, atraumatic  Eyes: PERRLA, EOM within normal limits, no conjunctival injection, no scleral icterus, visual fields and acuity grossly intact.  Ears: Normal shape and symmetry, no tenderness, no discharge. External canals are without any significant edema or erythema. Tympanic membranes are without any inflammation, no effusion. Gross auditory acuity is intact  Nose: Symmetrical without tenderness, no discharge.  Mouth: reasonable hygiene, no erythema exudates or tonsillar enlargement.  Neck: no masses, range of motion within normal limits, no tracheal deviation. No obvious thyroid enlargement.  Pulmonary: chest is symmetrical with respiration, no wheezes, crackles, or rhonchi.  Cardiovascular: regular rate and rhythm without murmurs, rubs, or gallops.  Extremities: no clubbing, cyanosis, or edema.    Urinalysis unremarkable    Please note that this dictation was created using voice recognition software. I have made every reasonable attempt to correct obvious errors, but I expect that there are errors of grammar and possibly content that I did not discover before finalizing the note.    Assessment/Plan:  1. Exposure to confirmed case of COVID-19  SARS-CoV-2 PCR (24 hour In-House): Collect NP swab in VTM   2. Urinary frequency  POCT Urinalysis   3. Wheeze  albuterol 108 (90 Base) MCG/ACT Aero Soln inhalation aerosol   discussed over-the-counter symptomatically, strict isolation until Covid test returns    Followup with primary care in the next 7-10 days if not significantly improving, return to the urgent care or go to the emergency room sooner for any worsening of symptoms.       "

## 2022-01-05 LAB
COVID ORDER STATUS COVID19: NORMAL
SARS-COV-2 RNA RESP QL NAA+PROBE: NOTDETECTED
SPECIMEN SOURCE: NORMAL

## 2022-01-10 ENCOUNTER — OFFICE VISIT (OUTPATIENT)
Dept: MEDICAL GROUP | Facility: PHYSICIAN GROUP | Age: 42
End: 2022-01-10
Payer: COMMERCIAL

## 2022-01-10 VITALS
BODY MASS INDEX: 31.14 KG/M2 | WEIGHT: 169.2 LBS | DIASTOLIC BLOOD PRESSURE: 76 MMHG | TEMPERATURE: 96.8 F | HEIGHT: 62 IN | RESPIRATION RATE: 16 BRPM | OXYGEN SATURATION: 99 % | SYSTOLIC BLOOD PRESSURE: 122 MMHG | HEART RATE: 88 BPM

## 2022-01-10 DIAGNOSIS — R73.01 IMPAIRED FASTING BLOOD SUGAR: ICD-10-CM

## 2022-01-10 DIAGNOSIS — M25.532 BILATERAL WRIST PAIN: ICD-10-CM

## 2022-01-10 DIAGNOSIS — F17.200 NICOTINE DEPENDENCE WITH CURRENT USE: ICD-10-CM

## 2022-01-10 DIAGNOSIS — Z12.31 VISIT FOR SCREENING MAMMOGRAM: ICD-10-CM

## 2022-01-10 DIAGNOSIS — E78.1 PURE HYPERTRIGLYCERIDEMIA: ICD-10-CM

## 2022-01-10 DIAGNOSIS — Z76.89 ENCOUNTER TO ESTABLISH CARE: ICD-10-CM

## 2022-01-10 DIAGNOSIS — E66.9 OBESITY (BMI 30-39.9): ICD-10-CM

## 2022-01-10 DIAGNOSIS — M25.531 BILATERAL WRIST PAIN: ICD-10-CM

## 2022-01-10 PROBLEM — G89.18 POSTOPERATIVE PAIN: Status: RESOLVED | Noted: 2018-02-08 | Resolved: 2022-01-10

## 2022-01-10 PROCEDURE — 99406 BEHAV CHNG SMOKING 3-10 MIN: CPT | Performed by: NURSE PRACTITIONER

## 2022-01-10 PROCEDURE — 99214 OFFICE O/P EST MOD 30 MIN: CPT | Mod: 25 | Performed by: NURSE PRACTITIONER

## 2022-01-10 ASSESSMENT — PATIENT HEALTH QUESTIONNAIRE - PHQ9: CLINICAL INTERPRETATION OF PHQ2 SCORE: 0

## 2022-01-10 NOTE — ASSESSMENT & PLAN NOTE
The 10-year ASCVD risk score (Love FALL Jr., et al., 2013) is: 4%    Values used to calculate the score:      Age: 41 years      Sex: Female      Is Non- : No      Diabetic: No      Tobacco smoker: Yes      Systolic Blood Pressure: 122 mmHg      Is BP treated: Yes      HDL Cholesterol: 40 mg/dL      Total Cholesterol: 168 mg/dL    This is a chronic condition.  Reviewed labs with patient ordered by previous primary care provider and answered all questions.  Patient is currently taking atorvastatin 20 mg tablet daily reports she is tolerating it well without any bothersome effects.  She denies any myalgia symptoms.  No refills needed at this time.    Patient is also currently taking fenofibrate 48 mg daily and is tolerating it well.  She denies any refills at this time.    Discussed the importance of diet lifestyle modifications to continue to decrease overall cholesterol levels.

## 2022-01-10 NOTE — ASSESSMENT & PLAN NOTE
Very pleasant 41-year-old female presents today to establish care and review the labs from previous primary care provider.  Have reviewed and updated her medical history, surgical history, family history, medications, allergies, and social terms of health.  Today she denies any pressing concerns or complaints.  Discussed need for COVID booster encourage patient to get this soon as possible.  Patient is due for Pap.  Encourage patient to schedule appointment with GYN as she reports her last Pap was 3 years ago.  Patient is due for annual mammogram screening.  Order was placed today.

## 2022-01-10 NOTE — ASSESSMENT & PLAN NOTE
This is a chronic and ongoing health concern.  Patient continues to smoke cigarettes and is aware of the risks associated in doing so.  She reports today that her New Year's resolution is to quit smoking however she does not want any formal logical medication to help with smoking at this time.  Did  patient for approximately 3 to 4 minutes about smoking cessation as well as risk associated and smoking cigarettes.

## 2022-01-10 NOTE — PROGRESS NOTES
Chief Complaint   Patient presents with   • New Patient     Est care    • Lab Results       Subjective:     HPI:   Bernice Billings is a 41 y.o. female here to discuss the evaluation and management of:      Bilateral wrist pain  Chronic and well-controlled condition.  Patient does report that she has occasional flares of numbness or tingling in her hands however it has decreased since her previous encounters.  She reports that she did follow-up with a nerve specialist indicating she did not have carpal tunnel syndrome.  Does not retires multilevel as needed for pain relief.    Encounter to establish care  Very pleasant 41-year-old female presents today to establish care and review the labs from previous primary care provider.  Have reviewed and updated her medical history, surgical history, family history, medications, allergies, and social terms of health.  Today she denies any pressing concerns or complaints.  Discussed need for COVID booster encourage patient to get this soon as possible.  Patient is due for Pap.  Encourage patient to schedule appointment with GYN as she reports her last Pap was 3 years ago.  Patient is due for annual mammogram screening.  Order was placed today.    Hyperlipidemia  The 10-year ASCVD risk score (Love JUAN DAVID Jr., et al., 2013) is: 4%    Values used to calculate the score:      Age: 41 years      Sex: Female      Is Non- : No      Diabetic: No      Tobacco smoker: Yes      Systolic Blood Pressure: 122 mmHg      Is BP treated: Yes      HDL Cholesterol: 40 mg/dL      Total Cholesterol: 168 mg/dL    This is a chronic condition.  Reviewed labs with patient ordered by previous primary care provider and answered all questions.  Patient is currently taking atorvastatin 20 mg tablet daily reports she is tolerating it well without any bothersome effects.  She denies any myalgia symptoms.  No refills needed at this time.    Patient is also currently taking fenofibrate 48  mg daily and is tolerating it well.  She denies any refills at this time.    Discussed the importance of diet lifestyle modifications to continue to decrease overall cholesterol levels.    Impaired fasting blood sugar  Component      Latest Ref Rng & Units 3/11/2021 12/4/2021   Glucose      65 - 99 mg/dL 100 (H) 115 (H)     This is a new condition.  Patient was shown to have impaired fasting blood sugars on most recent labs.  She denies any polyuria, polydipsia, headache, nausea, or vision changes.  She does report that she has a family history of diabetes however she has never been diagnosed with diabetes or prediabetes.  Discussed with patient checking A1c level at next appointment in 6 months.  She was agreeable to this to screen for diabetes.  Discussed the importance of appropriate diet and lifestyle modifications to help prevent diabetes for the future.    Nicotine dependence with current use  This is a chronic and ongoing health concern.  Patient continues to smoke cigarettes and is aware of the risks associated in doing so.  She reports today that her New Year's resolution is to quit smoking however she does not want any formal logical medication to help with smoking at this time.  Did  patient for approximately 3 to 4 minutes about smoking cessation as well as risk associated and smoking cigarettes.          ROS:  Gen: no fevers/chills, no changes in weight  Eyes: no changes in vision  ENT: no sore throat, no hearing loss, no bloody nose  Pulm: no sob, no cough  CV: no chest pain, no palpitations  GI: no nausea/vomiting, no diarrhea  : no dysuria  MSk: no myalgias  Neuro: no headaches, no numbness/tingling      Allergies   Allergen Reactions   • Shellfish Allergy      Tested positive on allergy test, and has hives with SHRIMP only       Current medicines (including changes today)  Current Outpatient Medications   Medication Sig Dispense Refill   • TRELEGY ELLIPTA 200-62.5-25 MCG/INH AEROSOL POWDER,  BREATH ACTIVATED Inhale 200 mcg every day.     • albuterol 108 (90 Base) MCG/ACT Aero Soln inhalation aerosol Inhale 2 Puffs every four hours as needed. With spacer device 18.2 g 0   • vitamin D2, Ergocalciferol, (DRISDOL) 1.25 MG (55471 UT) Cap capsule Take 1 Capsule by mouth every 7 days. 4 Capsule 0   • losartan-hydrochlorothiazide (HYZAAR) 50-12.5 MG per tablet TAKE ONE TABLET BY MOUTH ONCE A DAY 90 tablet 3   • fenofibrate (TRICOR) 48 MG Tab Take 1 tablet by mouth every day. 90 tablet 3   • atorvastatin (LIPITOR) 20 MG Tab TAKE 1 TABLET DAILY 90 tablet 3   • Multiple Vitamin (MULTI-VITAMIN PO) Take  by mouth every day.     • Albuterol Sulfate (PROAIR HFA INH) Inhale  by mouth as needed.     • loratadine (CLARITIN) 10 MG Tab Take 10 mg by mouth every day.       No current facility-administered medications for this visit.       Social History     Tobacco Use   • Smoking status: Current Some Day Smoker     Packs/day: 0.25     Years: 25.00     Pack years: 6.25   • Smokeless tobacco: Never Used   • Tobacco comment: 1 pack per week   Vaping Use   • Vaping Use: Never used   Substance Use Topics   • Alcohol use: Yes     Alcohol/week: 1.0 oz     Types: 2 Shots of liquor per week     Comment: 3 per week    • Drug use: No       Patient Active Problem List    Diagnosis Date Noted   • Encounter to establish care 01/10/2022   • Impaired fasting blood sugar 01/10/2022   • Situational depression 10/22/2020   • Bilateral wrist pain 10/22/2020   • Vitamin D deficiency 10/01/2019   • Obesity (BMI 30-39.9) 10/01/2019   • Nicotine dependence with current use 05/13/2019   • Allergic rhinitis due to pollen 06/24/2015   • Onychomycosis 06/24/2015   • Hyperlipidemia 02/11/2015   • Hypertension 01/06/2015   • Hives 01/06/2015       Family History   Problem Relation Age of Onset   • Hypertension Mother    • Diabetes Father    • Hypertension Father    • Heart Disease Father    • No Known Problems Brother    • Ovarian Cancer Maternal  "Grandmother    • Lung Cancer Maternal Grandfather    • No Known Problems Paternal Grandmother    • No Known Problems Paternal Grandfather    • No Known Problems Brother    • No Known Problems Daughter           Objective:     Hospital Outpatient Visit on 01/03/2022   Component Date Value Ref Range Status   • COVID Order Status 01/03/2022 Received   Final    Comment: The order for SARS CoV-2 testing has been received by the  Laboratory. This result is neither positive nor negative.  Final results of testing will report separately.     • SARS-CoV-2 Source 01/03/2022 Nasal Swab   Final   • SARS-CoV-2 by PCR 01/03/2022 NotDetected   Final    Comment: PATIENTS: Important information regarding your results and instructions can  be found at [email or web address]   \"After your  Covid-19 Test\"    [facility] providers: PLEASE REFER TO DE-ESCALATION AND RETESTING PROTOCOL  on [email or web address]    **The Roche SARS-CoV-2 RT-PCR test has been made available for use under the  Emergency Use Authorization (EUA) only.     Office Visit on 01/03/2022   Component Date Value Ref Range Status   • POC Color 01/03/2022 YELLOW  Negative Final   • POC Appearance 01/03/2022 CLEAR  Negative Final   • POC Leukocyte Esterase 01/03/2022 NEG  Negative Final   • POC Nitrites 01/03/2022 NEG  Negative Final   • POC Urobiligen 01/03/2022 0.2  Negative (0.2) mg/dL Final   • POC Protein 01/03/2022 NEG  Negative mg/dL Final   • POC Urine PH 01/03/2022 6.5  5.0 - 8.0 Final   • POC Blood 01/03/2022 SMALL  Negative Final   • POC Specific Gravity 01/03/2022 1.015  <1.005 - >1.030 Final   • POC Ketones 01/03/2022 NEG  Negative mg/dL Final   • POC Bilirubin 01/03/2022 NEG  Negative mg/dL Final   • POC Glucose 01/03/2022 NEG  Negative mg/dL Final       /76 (BP Location: Right arm, Patient Position: Sitting, BP Cuff Size: Adult)   Pulse 88   Temp 36 °C (96.8 °F) (Temporal)   Resp 16   Ht 1.575 m (5' 2\")   Wt 76.7 kg (169 lb 3.2 " oz)   SpO2 99%  Body mass index is 30.95 kg/m².    Physical Exam:  Constitutional: Well-developed and well-nourished female in NAD. Not diaphoretic. No distress.   Skin: warm, dry, intact, no evidence of rash or concerning lesions  Head: Atraumatic without lesions.  Eyes: Conjunctivae and extraocular motions are normal. Pupils are equal, round, and reactive to light. No scleral icterus.   Ears:  External ears unremarkable. TMs normal; bilaterally  Mouth/Throat: Tongue normal. Oropharynx is clear and moist. Posterior pharynx without erythema or exudates.  Neck: Supple, trachea midline. No thyromegaly present. No cervical or supraclavicular lymphadenopathy.  Cardiovascular: Regular rate and rhythm without murmur. Radial pulses are intact and equal bilaterally.  Pulmonary: Clear to ausculation. Normal effort. No rales, ronchi, or wheezing.  Abdomen: Soft, non tender, and without distention. Active bowel sounds in all four quadrants.  Extremities: No cyanosis, clubbing, erythema, nor edema.   Neurological: Alert and oriented x 3. No cranial nerve deficit. 5/5 myotomes. Sensation intact.   Psychiatric:  Behavior, mood, and affect are appropriate.      Assessment and Plan:     The following treatment plan was discussed:  Reviewed labs that were ordered by her previous primary care provider and discussed results with patient and answered all questions.    1. Encounter to establish care    2. Impaired fasting blood sugar  This is a new condition.  Discussed importance of appropriate diet lifestyle modifications to help improve fasting blood sugar levels.  Discussed with patient risk for prediabetes or possible diabetes in the future.  A1c level was ordered.  - Comp Metabolic Panel; Future  - HEMOGLOBIN A1C; Future    3. Obesity (BMI 30-39.9)  Discussed diet, exercise, weight loss, behavioral modification, portion size management.  - TSH WITH REFLEX TO FT4; Future    4. Pure hypertriglyceridemia  Well controlled.  Labs as  indicated.  Continue statin medication and lifestyle modifications.  Continue to monitor.  - Lipid Profile; Future    5. Bilateral wrist pain  Resolved    6. Visit for screening mammogram  - MA-SCREENING MAMMO BILAT W/TOMOSYNTHESIS W/CAD; Future    7. Nicotine dependence with current use  Ready to quit: No  Counseling given: Yes  Comment: 1 pack per week  Encourage patient to quit smoking.  Counseled given for 3 to 4 minutes during our exam today.  Currently has declined any form logical medications to help with quitting smoking.      Any change or worsening of signs or symptoms, patient encouraged to follow-up or report to emergency room for further evaluation. Patient verbalizes understanding and agrees.    Follow-Up: Return in about 6 months (around 7/10/2022) for Follow up labs.      PLEASE NOTE: This dictation was created using voice recognition software. I have made every reasonable attempt to correct obvious errors, but I expect that there are errors of grammar and possibly content that I did not discover before finalizing the note.

## 2022-01-10 NOTE — ASSESSMENT & PLAN NOTE
Chronic and well-controlled condition.  Patient does report that she has occasional flares of numbness or tingling in her hands however it has decreased since her previous encounters.  She reports that she did follow-up with a nerve specialist indicating she did not have carpal tunnel syndrome.  Does not retires multilevel as needed for pain relief.

## 2022-01-10 NOTE — ASSESSMENT & PLAN NOTE
Component      Latest Ref Rng & Units 3/11/2021 12/4/2021   Glucose      65 - 99 mg/dL 100 (H) 115 (H)     This is a new condition.  Patient was shown to have impaired fasting blood sugars on most recent labs.  She denies any polyuria, polydipsia, headache, nausea, or vision changes.  She does report that she has a family history of diabetes however she has never been diagnosed with diabetes or prediabetes.  Discussed with patient checking A1c level at next appointment in 6 months.  She was agreeable to this to screen for diabetes.  Discussed the importance of appropriate diet and lifestyle modifications to help prevent diabetes for the future.

## 2022-01-10 NOTE — PATIENT INSTRUCTIONS
MyPlate from USDA    MyPlate is an outline of a general healthy diet based on the 2010 Dietary Guidelines for Americans, from the U.S. Department of Agriculture (USDA). It sets guidelines for how much food you should eat from each food group based on your age, sex, and level of physical activity.  What are tips for following MyPlate?  To follow MyPlate recommendations:  · Eat a wide variety of fruits and vegetables, grains, and protein foods.  · Serve smaller portions and eat less food throughout the day.  · Limit portion sizes to avoid overeating.  · Enjoy your food.  · Get at least 150 minutes of exercise every week. This is about 30 minutes each day, 5 or more days per week.  It can be difficult to have every meal look like MyPlate. Think about MyPlate as eating guidelines for an entire day, rather than each individual meal.  Fruits and vegetables  · Make half of your plate fruits and vegetables.  · Eat many different colors of fruits and vegetables each day.  · For a 2,000 calorie daily food plan, eat:  ? 2½ cups of vegetables every day.  ? 2 cups of fruit every day.  · 1 cup is equal to:  ? 1 cup raw or cooked vegetables.  ? 1 cup raw fruit.  ? 1 medium-sized orange, apple, or banana.  ? 1 cup 100% fruit or vegetable juice.  ? 2 cups raw leafy greens, such as lettuce, spinach, or kale.  ? ½ cup dried fruit.  Grains  · One fourth of your plate should be grains.  · Make at least half of the grains you eat each day whole grains.  · For a 2,000 calorie daily food plan, eat 6 oz of grains every day.  · 1 oz is equal to:  ? 1 slice bread.  ? 1 cup cereal.  ? ½ cup cooked rice, cereal, or pasta.  Protein  · One fourth of your plate should be protein.  · Eat a wide variety of protein foods, including meat, poultry, fish, eggs, beans, nuts, and tofu.  · For a 2,000 calorie daily food plan, eat 5½ oz of protein every day.  · 1 oz is equal to:  ? 1 oz meat, poultry, or fish.  ? ¼ cup cooked beans.  ? 1 egg.  ? ½ oz nuts  or seeds.  ? 1 Tbsp peanut butter.  Dairy  · Drink fat-free or low-fat (1%) milk.  · Eat or drink dairy as a side to meals.  · For a 2,000 calorie daily food plan, eat or drink 3 cups of dairy every day.  · 1 cup is equal to:  ? 1 cup milk, yogurt, cottage cheese, or soy milk (soy beverage).  ? 2 oz processed cheese.  ? 1½ oz natural cheese.  Fats, oils, salt, and sugars  · Only small amounts of oils are recommended.  · Avoid foods that are high in calories and low in nutritional value (empty calories), like foods high in fat or added sugars.  · Choose foods that are low in salt (sodium). Choose foods that have less than 140 milligrams (mg) of sodium per serving.  · Drink water instead of sugary drinks. Drink enough water each day to keep your urine pale yellow.  Where to find support  · Work with your health care provider or a nutrition specialist (dietitian) to develop a customized eating plan that is right for you.  · Download an ottoniel (mobile application) to help you track your daily food intake.  Where to find more information  · Go to ChooseMyPlate.gov for more information.  · Learn more and log your daily food intake according to MyPlate using USDA's YR.MRKTcker: www.Total Eclipseer.usda.gov  Summary  · MyPlate is a general guideline for healthy eating from the USDA. It is based on the 2010 Dietary Guidelines for Americans.  · In general, fruits and vegetables should take up ½ of your plate, grains should take up ¼ of your plate, and protein should take up ¼ of your plate.  This information is not intended to replace advice given to you by your health care provider. Make sure you discuss any questions you have with your health care provider.  Document Released: 01/06/2009 Document Revised: 01/19/2019 Document Reviewed: 03/19/2018  [employer] Patient Education © 2020 [employer].    Smoking Cessation, Tips for Success  If you are ready to quit smoking, congratulations! You have chosen to help yourself be healthier.  "Cigarettes bring nicotine, tar, carbon monoxide, and other irritants into your body. Your lungs, heart, and blood vessels will be able to work better without these poisons. There are many different ways to quit smoking. Nicotine gum, nicotine patches, a nicotine inhaler, or nicotine nasal spray can help with physical craving. Hypnosis, support groups, and medicines help break the habit of smoking.  WHAT THINGS CAN I DO TO MAKE QUITTING EASIER?   Here are some tips to help you quit for good:  · Pick a date when you will quit smoking completely. Tell all of your friends and family about your plan to quit on that date.  · Do not try to slowly cut down on the number of cigarettes you are smoking. Pick a quit date and quit smoking completely starting on that day.  · Throw away all cigarettes.    · Clean and remove all ashtrays from your home, work, and car.  · On a card, write down your reasons for quitting. Carry the card with you and read it when you get the urge to smoke.  · Cleanse your body of nicotine. Drink enough water and fluids to keep your urine clear or pale yellow. Do this after quitting to flush the nicotine from your body.  · Learn to predict your moods. Do not let a bad situation be your excuse to have a cigarette. Some situations in your life might tempt you into wanting a cigarette.  · Never have \"just one\" cigarette. It leads to wanting another and another. Remind yourself of your decision to quit.  · Change habits associated with smoking. If you smoked while driving or when feeling stressed, try other activities to replace smoking. Stand up when drinking your coffee. Brush your teeth after eating. Sit in a different chair when you read the paper. Avoid alcohol while trying to quit, and try to drink fewer caffeinated beverages. Alcohol and caffeine may urge you to smoke.  · Avoid foods and drinks that can trigger a desire to smoke, such as sugary or spicy foods and alcohol.  · Ask people who smoke not " "to smoke around you.  · Have something planned to do right after eating or having a cup of coffee. For example, plan to take a walk or exercise.  · Try a relaxation exercise to calm you down and decrease your stress. Remember, you may be tense and nervous for the first 2 weeks after you quit, but this will pass.  · Find new activities to keep your hands busy. Play with a pen, coin, or rubber band. Doodle or draw things on paper.  · Brush your teeth right after eating. This will help cut down on the craving for the taste of tobacco after meals. You can also try mouthwash.    · Use oral substitutes in place of cigarettes. Try using lemon drops, carrots, cinnamon sticks, or chewing gum. Keep them handy so they are available when you have the urge to smoke.  · When you have the urge to smoke, try deep breathing.  · Designate your home as a nonsmoking area.  · If you are a heavy smoker, ask your health care provider about a prescription for nicotine chewing gum. It can ease your withdrawal from nicotine.  · Reward yourself. Set aside the cigarette money you save and buy yourself something nice.  · Look for support from others. Join a support group or smoking cessation program. Ask someone at home or at work to help you with your plan to quit smoking.  · Always ask yourself, \"Do I need this cigarette or is this just a reflex?\" Tell yourself, \"Today, I choose not to smoke,\" or \"I do not want to smoke.\" You are reminding yourself of your decision to quit.  · Do not replace cigarette smoking with electronic cigarettes (commonly called e-cigarettes). The safety of e-cigarettes is unknown, and some may contain harmful chemicals.  · If you relapse, do not give up! Plan ahead and think about what you will do the next time you get the urge to smoke.  HOW WILL I FEEL WHEN I QUIT SMOKING?  You may have symptoms of withdrawal because your body is used to nicotine (the addictive substance in cigarettes). You may crave cigarettes, be " irritable, feel very hungry, cough often, get headaches, or have difficulty concentrating. The withdrawal symptoms are only temporary. They are strongest when you first quit but will go away within 10-14 days. When withdrawal symptoms occur, stay in control. Think about your reasons for quitting. Remind yourself that these are signs that your body is healing and getting used to being without cigarettes. Remember that withdrawal symptoms are easier to treat than the major diseases that smoking can cause.   Even after the withdrawal is over, expect periodic urges to smoke. However, these cravings are generally short lived and will go away whether you smoke or not. Do not smoke!  WHAT RESOURCES ARE AVAILABLE TO HELP ME QUIT SMOKING?  Your health care provider can direct you to community resources or hospitals for support, which may include:  · Group support.  · Education.  · Hypnosis.  · Therapy.     This information is not intended to replace advice given to you by your health care provider. Make sure you discuss any questions you have with your health care provider.     Document Released: 09/15/2005 Document Revised: 01/08/2016 Document Reviewed: 06/05/2014  [employer] Interactive Patient Education ©2016 [employer]

## 2022-02-03 ENCOUNTER — APPOINTMENT (OUTPATIENT)
Dept: RADIOLOGY | Facility: MEDICAL CENTER | Age: 42
End: 2022-02-03
Attending: NURSE PRACTITIONER
Payer: COMMERCIAL

## 2022-03-10 ENCOUNTER — APPOINTMENT (OUTPATIENT)
Dept: RADIOLOGY | Facility: MEDICAL CENTER | Age: 42
End: 2022-03-10
Attending: NURSE PRACTITIONER
Payer: COMMERCIAL

## 2022-03-10 DIAGNOSIS — E78.5 HYPERLIPIDEMIA, UNSPECIFIED HYPERLIPIDEMIA TYPE: ICD-10-CM

## 2022-03-10 RX ORDER — FENOFIBRATE 48 MG/1
48 TABLET, COATED ORAL DAILY
Qty: 90 TABLET | Refills: 3 | Status: SHIPPED | OUTPATIENT
Start: 2022-03-10 | End: 2023-01-17

## 2022-03-11 NOTE — TELEPHONE ENCOUNTER
Received request via: Pharmacy    Was the patient seen in the last year in this department? Yes    Does the patient have an active prescription (recently filled or refills available) for medication(s) requested? No     Last ov: 1/10/22

## 2022-03-14 DIAGNOSIS — E78.5 HYPERLIPIDEMIA, UNSPECIFIED HYPERLIPIDEMIA TYPE: ICD-10-CM

## 2022-03-14 RX ORDER — ATORVASTATIN CALCIUM 20 MG/1
20 TABLET, FILM COATED ORAL DAILY
Qty: 90 TABLET | Refills: 3 | Status: SHIPPED | OUTPATIENT
Start: 2022-03-14 | End: 2023-03-06

## 2022-03-15 DIAGNOSIS — I10 ESSENTIAL HYPERTENSION: ICD-10-CM

## 2022-03-15 DIAGNOSIS — E55.9 VITAMIN D DEFICIENCY: ICD-10-CM

## 2022-03-15 RX ORDER — ERGOCALCIFEROL 1.25 MG/1
50000 CAPSULE ORAL
Qty: 4 CAPSULE | Refills: 0 | Status: SHIPPED | OUTPATIENT
Start: 2022-03-15 | End: 2022-06-30 | Stop reason: SDUPTHER

## 2022-03-15 RX ORDER — LOSARTAN POTASSIUM AND HYDROCHLOROTHIAZIDE 12.5; 5 MG/1; MG/1
1 TABLET ORAL DAILY
Qty: 90 TABLET | Refills: 3 | Status: SHIPPED | OUTPATIENT
Start: 2022-03-15 | End: 2023-01-17

## 2022-03-15 NOTE — TELEPHONE ENCOUNTER
Received request via: Patient    Was the patient seen in the last year in this department? Yes    Does the patient have an active prescription (recently filled or refills available) for medication(s) requested? No     New pharmacy

## 2022-06-18 ENCOUNTER — HOSPITAL ENCOUNTER (OUTPATIENT)
Dept: LAB | Facility: MEDICAL CENTER | Age: 42
End: 2022-06-18
Attending: NURSE PRACTITIONER
Payer: COMMERCIAL

## 2022-06-18 DIAGNOSIS — E78.1 PURE HYPERTRIGLYCERIDEMIA: ICD-10-CM

## 2022-06-18 DIAGNOSIS — E66.9 OBESITY (BMI 30-39.9): ICD-10-CM

## 2022-06-18 DIAGNOSIS — R73.01 IMPAIRED FASTING BLOOD SUGAR: ICD-10-CM

## 2022-06-18 LAB
ALBUMIN SERPL BCP-MCNC: 4.3 G/DL (ref 3.2–4.9)
ALBUMIN/GLOB SERPL: 1.3 G/DL
ALP SERPL-CCNC: 77 U/L (ref 30–99)
ALT SERPL-CCNC: 12 U/L (ref 2–50)
ANION GAP SERPL CALC-SCNC: 16 MMOL/L (ref 7–16)
AST SERPL-CCNC: 17 U/L (ref 12–45)
BILIRUB SERPL-MCNC: 0.3 MG/DL (ref 0.1–1.5)
BUN SERPL-MCNC: 12 MG/DL (ref 8–22)
CALCIUM SERPL-MCNC: 9.4 MG/DL (ref 8.5–10.5)
CHLORIDE SERPL-SCNC: 101 MMOL/L (ref 96–112)
CHOLEST SERPL-MCNC: 190 MG/DL (ref 100–199)
CO2 SERPL-SCNC: 20 MMOL/L (ref 20–33)
CREAT SERPL-MCNC: 0.47 MG/DL (ref 0.5–1.4)
EST. AVERAGE GLUCOSE BLD GHB EST-MCNC: 123 MG/DL
FASTING STATUS PATIENT QL REPORTED: NORMAL
GFR SERPLBLD CREATININE-BSD FMLA CKD-EPI: 121 ML/MIN/1.73 M 2
GLOBULIN SER CALC-MCNC: 3.4 G/DL (ref 1.9–3.5)
GLUCOSE SERPL-MCNC: 98 MG/DL (ref 65–99)
HBA1C MFR BLD: 5.9 % (ref 4–5.6)
HDLC SERPL-MCNC: 41 MG/DL
LDLC SERPL CALC-MCNC: 103 MG/DL
POTASSIUM SERPL-SCNC: 4.2 MMOL/L (ref 3.6–5.5)
PROT SERPL-MCNC: 7.7 G/DL (ref 6–8.2)
SODIUM SERPL-SCNC: 137 MMOL/L (ref 135–145)
TRIGL SERPL-MCNC: 231 MG/DL (ref 0–149)
TSH SERPL DL<=0.005 MIU/L-ACNC: 1.4 UIU/ML (ref 0.38–5.33)

## 2022-06-18 PROCEDURE — 80061 LIPID PANEL: CPT

## 2022-06-18 PROCEDURE — 80053 COMPREHEN METABOLIC PANEL: CPT

## 2022-06-18 PROCEDURE — 36415 COLL VENOUS BLD VENIPUNCTURE: CPT

## 2022-06-18 PROCEDURE — 83036 HEMOGLOBIN GLYCOSYLATED A1C: CPT

## 2022-06-18 PROCEDURE — 84443 ASSAY THYROID STIM HORMONE: CPT

## 2022-06-30 ENCOUNTER — OFFICE VISIT (OUTPATIENT)
Dept: MEDICAL GROUP | Facility: PHYSICIAN GROUP | Age: 42
End: 2022-06-30
Payer: COMMERCIAL

## 2022-06-30 VITALS
BODY MASS INDEX: 29.04 KG/M2 | HEART RATE: 77 BPM | DIASTOLIC BLOOD PRESSURE: 76 MMHG | WEIGHT: 157.8 LBS | TEMPERATURE: 98.5 F | OXYGEN SATURATION: 97 % | RESPIRATION RATE: 16 BRPM | SYSTOLIC BLOOD PRESSURE: 122 MMHG | HEIGHT: 62 IN

## 2022-06-30 DIAGNOSIS — F17.200 NICOTINE DEPENDENCE WITH CURRENT USE: ICD-10-CM

## 2022-06-30 DIAGNOSIS — E55.9 VITAMIN D DEFICIENCY: ICD-10-CM

## 2022-06-30 DIAGNOSIS — E66.9 OBESITY (BMI 30-39.9): ICD-10-CM

## 2022-06-30 DIAGNOSIS — Z23 NEED FOR VACCINATION: ICD-10-CM

## 2022-06-30 DIAGNOSIS — R73.01 IMPAIRED FASTING BLOOD SUGAR: ICD-10-CM

## 2022-06-30 PROCEDURE — 90677 PCV20 VACCINE IM: CPT | Performed by: NURSE PRACTITIONER

## 2022-06-30 PROCEDURE — 99214 OFFICE O/P EST MOD 30 MIN: CPT | Mod: 25 | Performed by: NURSE PRACTITIONER

## 2022-06-30 PROCEDURE — 99406 BEHAV CHNG SMOKING 3-10 MIN: CPT | Mod: 25 | Performed by: NURSE PRACTITIONER

## 2022-06-30 PROCEDURE — 90471 IMMUNIZATION ADMIN: CPT | Performed by: NURSE PRACTITIONER

## 2022-06-30 RX ORDER — METFORMIN HYDROCHLORIDE 500 MG/1
500 TABLET, EXTENDED RELEASE ORAL DAILY
Qty: 90 TABLET | Refills: 1 | Status: SHIPPED | OUTPATIENT
Start: 2022-06-30 | End: 2022-10-05

## 2022-06-30 RX ORDER — ERGOCALCIFEROL 1.25 MG/1
50000 CAPSULE ORAL
Qty: 12 CAPSULE | Refills: 0 | Status: SHIPPED | OUTPATIENT
Start: 2022-06-30 | End: 2023-07-19

## 2022-06-30 NOTE — ASSESSMENT & PLAN NOTE
Patient has previous history of vitamin D deficiency.  Most recent labs indicate normal vitamin D levels.  Patient is currently taking 50,000 units of vitamin D once weekly.  Today she is requesting refill.

## 2022-06-30 NOTE — PROGRESS NOTES
"Chief Complaint   Patient presents with   • Follow-Up     labs       Subjective:     HPI:   Berince Billings is a 41 y.o. female here to discuss the evaluation and management of:      Impaired fasting blood sugar  Chronic condition.  A1c level 5.6 indicating prediabetes.  Patient does report strong family history of diabetes.  Patient denies any polyuria, polydipsia, headache, nausea, or vision changes.      Obesity (BMI 30-39.9)  Vitals 1/3/2022 1/10/2022 6/30/2022   WEIGHT 170 169.2 157.8   HEIGHT 5' 2\" 5' 2\" 5' 2\"   BMI 31.09 kg/m2 30.95 kg/m2 28.86 kg/m2     Chronic ongoing health concern.  Gradually patient on her 12 pound weight loss since her last appointment.  Patient has been working hard on diet lifestyle modifications.      Vitamin D deficiency  Patient has previous history of vitamin D deficiency.  Most recent labs indicate normal vitamin D levels.  Patient is currently taking 50,000 units of vitamin D once weekly.  Today she is requesting refill.    Nicotine dependence with current use  Chronic ongoing health concern.  Patient does identify with her tobacco dependency.  She reports she smokes 1 pack of cigarettes per week.  She does want a work on quitting back however she does not at this time want any medications to help quit.        ROS:  Gen: no fevers/chills, no changes in weight  Pulm: no sob, no cough  CV: no chest pain, no palpitations  GI: no nausea/vomiting, no diarrhea  MSk: no myalgias  Neuro: no headaches, no numbness/tingling      Allergies   Allergen Reactions   • Shellfish Allergy      Tested positive on allergy test, and has hives with SHRIMP only       Current medicines (including changes today)  Current Outpatient Medications   Medication Sig Dispense Refill   • vitamin D2, Ergocalciferol, (DRISDOL) 1.25 MG (88158 UT) Cap capsule Take 1 Capsule by mouth every 7 days. 12 Capsule 0   • metFORMIN ER (GLUCOPHAGE XR) 500 MG TABLET SR 24 HR Take 1 Tablet by mouth every day. 90 Tablet 1   • " "losartan-hydrochlorothiazide (HYZAAR) 50-12.5 MG per tablet Take 1 Tablet by mouth every day. 90 Tablet 3   • atorvastatin (LIPITOR) 20 MG Tab Take 1 Tablet by mouth every day. 90 Tablet 3   • fenofibrate (TRICOR) 48 MG Tab Take 1 Tablet by mouth every day. 90 Tablet 3   • TRELEGY ELLIPTA 200-62.5-25 MCG/INH AEROSOL POWDER, BREATH ACTIVATED Inhale 200 mcg every day.     • albuterol 108 (90 Base) MCG/ACT Aero Soln inhalation aerosol Inhale 2 Puffs every four hours as needed. With spacer device 18.2 g 0   • Multiple Vitamin (MULTI-VITAMIN PO) Take  by mouth every day.     • Albuterol Sulfate (PROAIR HFA INH) Inhale  by mouth as needed.     • loratadine (CLARITIN) 10 MG Tab Take 10 mg by mouth every day.       No current facility-administered medications for this visit.          Objective:       /76 (BP Location: Right arm)   Pulse 77   Temp 36.9 °C (98.5 °F) (Oral)   Resp 16   Ht 1.575 m (5' 2\")   Wt 71.6 kg (157 lb 12.8 oz)   SpO2 97%  Body mass index is 28.86 kg/m².    Physical Exam:  Constitutional: Well-developed and well-nourished female in NAD. Not diaphoretic. No distress.   Skin: warm, dry, intact  Cardiovascular: Regular rate and rhythm without murmur. Radial pulses are intact and equal bilaterally.  Pulmonary: Clear to ausculation. Normal effort. No rales, ronchi, or wheezing..   Extremities: No cyanosis, clubbing, erythema, nor edema.   Neurological: Alert and oriented x 3.   Psychiatric:  Behavior, mood, and affect are appropriate.      Assessment and Plan:     The following treatment plan was discussed:  I have reviewed labs with patient and answered all questions.    1. Obesity (BMI 30-39.9)  I advise to reduce sugar/carbohydrate/alcohol, eat more vegetables and lean meats such as fish/chicken/turkey. We also recommend 150 minutes of cardiovascular exercise weekly.  - metFORMIN ER (GLUCOPHAGE XR) 500 MG TABLET SR 24 HR; Take 1 Tablet by mouth every day.  Dispense: 90 Tablet; Refill: 1  - " HEMOGLOBIN A1C; Future  - VITAMIN D,25 HYDROXY; Future  - Blood Glucose; Future    2. Impaired fasting blood sugar  Discussed diet, exercise, weight loss, behavioral modification, portion size management.  At length discussion with patient regards to starting metformin to help with impaired fasting blood sugars and prediabetes.  Patient was agreeable to this and metformin was placed today.  Side effects medication were discussed.  Repeat labs in 3 months.  - metFORMIN ER (GLUCOPHAGE XR) 500 MG TABLET SR 24 HR; Take 1 Tablet by mouth every day.  Dispense: 90 Tablet; Refill: 1  - HEMOGLOBIN A1C; Future  - VITAMIN D,25 HYDROXY; Future  - Blood Glucose; Future    3. Nicotine dependence with current use   Chronic ongoing health concern  was given regards to the risks associated with smoking cigarettes as well as tips to help quit smoking for 4 minutes during our exam.  Patient was strongly encouraged to quit smoking.    4. Vitamin D deficiency  Patient doing well on current vitamin D dose.  Refill was sent to pharmacy.  Repeat lab orders as indicated below.  - vitamin D2, Ergocalciferol, (DRISDOL) 1.25 MG (62757 UT) Cap capsule; Take 1 Capsule by mouth every 7 days.  Dispense: 12 Capsule; Refill: 0  -Vitamin D    5. Need for vaccination  - Pneumococcal Conjugate Vaccine 20-Valent (19 yrs+)      Any change or worsening of signs or symptoms, patient encouraged to follow-up or report to urgent care or emergency room for further evaluation. Patient verbalizes understanding and agrees.    Follow-Up: Return in about 3 months (around 9/30/2022) for Follow up labs, smoking.      PLEASE NOTE: This dictation was created using voice recognition software. I have made every reasonable attempt to correct obvious errors, but I expect that there are errors of grammar and possibly content that I did not discover before finalizing the note.

## 2022-06-30 NOTE — ASSESSMENT & PLAN NOTE
Chronic condition.  A1c level 5.6 indicating prediabetes.  Patient does report strong family history of diabetes.  Patient denies any polyuria, polydipsia, headache, nausea, or vision changes.

## 2022-06-30 NOTE — ASSESSMENT & PLAN NOTE
Chronic ongoing health concern.  Patient does identify with her tobacco dependency.  She reports she smokes 1 pack of cigarettes per week.  She does want a work on quitting back however she does not at this time want any medications to help quit.

## 2022-06-30 NOTE — PATIENT INSTRUCTIONS
"Smoking Cessation, Tips for Success  If you are ready to quit smoking, congratulations! You have chosen to help yourself be healthier. Cigarettes bring nicotine, tar, carbon monoxide, and other irritants into your body. Your lungs, heart, and blood vessels will be able to work better without these poisons. There are many different ways to quit smoking. Nicotine gum, nicotine patches, a nicotine inhaler, or nicotine nasal spray can help with physical craving. Hypnosis, support groups, and medicines help break the habit of smoking.  WHAT THINGS CAN I DO TO MAKE QUITTING EASIER?   Here are some tips to help you quit for good:  Pick a date when you will quit smoking completely. Tell all of your friends and family about your plan to quit on that date.  Do not try to slowly cut down on the number of cigarettes you are smoking. Pick a quit date and quit smoking completely starting on that day.  Throw away all cigarettes.    Clean and remove all ashtrays from your home, work, and car.  On a card, write down your reasons for quitting. Carry the card with you and read it when you get the urge to smoke.  Cleanse your body of nicotine. Drink enough water and fluids to keep your urine clear or pale yellow. Do this after quitting to flush the nicotine from your body.  Learn to predict your moods. Do not let a bad situation be your excuse to have a cigarette. Some situations in your life might tempt you into wanting a cigarette.  Never have \"just one\" cigarette. It leads to wanting another and another. Remind yourself of your decision to quit.  Change habits associated with smoking. If you smoked while driving or when feeling stressed, try other activities to replace smoking. Stand up when drinking your coffee. Brush your teeth after eating. Sit in a different chair when you read the paper. Avoid alcohol while trying to quit, and try to drink fewer caffeinated beverages. Alcohol and caffeine may urge you to smoke.  Avoid foods and " "drinks that can trigger a desire to smoke, such as sugary or spicy foods and alcohol.  Ask people who smoke not to smoke around you.  Have something planned to do right after eating or having a cup of coffee. For example, plan to take a walk or exercise.  Try a relaxation exercise to calm you down and decrease your stress. Remember, you may be tense and nervous for the first 2 weeks after you quit, but this will pass.  Find new activities to keep your hands busy. Play with a pen, coin, or rubber band. Doodle or draw things on paper.  Brush your teeth right after eating. This will help cut down on the craving for the taste of tobacco after meals. You can also try mouthwash.    Use oral substitutes in place of cigarettes. Try using lemon drops, carrots, cinnamon sticks, or chewing gum. Keep them handy so they are available when you have the urge to smoke.  When you have the urge to smoke, try deep breathing.  Designate your home as a nonsmoking area.  If you are a heavy smoker, ask your health care provider about a prescription for nicotine chewing gum. It can ease your withdrawal from nicotine.  Reward yourself. Set aside the cigarette money you save and buy yourself something nice.  Look for support from others. Join a support group or smoking cessation program. Ask someone at home or at work to help you with your plan to quit smoking.  Always ask yourself, \"Do I need this cigarette or is this just a reflex?\" Tell yourself, \"Today, I choose not to smoke,\" or \"I do not want to smoke.\" You are reminding yourself of your decision to quit.  Do not replace cigarette smoking with electronic cigarettes (commonly called e-cigarettes). The safety of e-cigarettes is unknown, and some may contain harmful chemicals.  If you relapse, do not give up! Plan ahead and think about what you will do the next time you get the urge to smoke.  HOW WILL I FEEL WHEN I QUIT SMOKING?  You may have symptoms of withdrawal because your body is " used to nicotine (the addictive substance in cigarettes). You may crave cigarettes, be irritable, feel very hungry, cough often, get headaches, or have difficulty concentrating. The withdrawal symptoms are only temporary. They are strongest when you first quit but will go away within 10-14 days. When withdrawal symptoms occur, stay in control. Think about your reasons for quitting. Remind yourself that these are signs that your body is healing and getting used to being without cigarettes. Remember that withdrawal symptoms are easier to treat than the major diseases that smoking can cause.   Even after the withdrawal is over, expect periodic urges to smoke. However, these cravings are generally short lived and will go away whether you smoke or not. Do not smoke!  WHAT RESOURCES ARE AVAILABLE TO HELP ME QUIT SMOKING?  Your health care provider can direct you to community resources or hospitals for support, which may include:  Group support.  Education.  Hypnosis.  Therapy.     This information is not intended to replace advice given to you by your health care provider. Make sure you discuss any questions you have with your health care provider.     Document Released: 09/15/2005 Document Revised: 01/08/2016 Document Reviewed: 06/05/2014  [employer] Interactive Patient Education ©2016 [employer]

## 2022-06-30 NOTE — ASSESSMENT & PLAN NOTE
"Vitals 1/3/2022 1/10/2022 6/30/2022   WEIGHT 170 169.2 157.8   HEIGHT 5' 2\" 5' 2\" 5' 2\"   BMI 31.09 kg/m2 30.95 kg/m2 28.86 kg/m2     Chronic ongoing health concern.  Gradually patient on her 12 pound weight loss since her last appointment.  Patient has been working hard on diet lifestyle modifications.    "

## 2022-09-08 ENCOUNTER — HOSPITAL ENCOUNTER (OUTPATIENT)
Dept: RADIOLOGY | Facility: MEDICAL CENTER | Age: 42
End: 2022-09-08
Attending: NURSE PRACTITIONER
Payer: COMMERCIAL

## 2022-09-08 DIAGNOSIS — Z12.31 VISIT FOR SCREENING MAMMOGRAM: ICD-10-CM

## 2022-09-08 PROCEDURE — 77063 BREAST TOMOSYNTHESIS BI: CPT

## 2022-09-15 ENCOUNTER — HOSPITAL ENCOUNTER (OUTPATIENT)
Dept: LAB | Facility: MEDICAL CENTER | Age: 42
End: 2022-09-15
Attending: NURSE PRACTITIONER
Payer: COMMERCIAL

## 2022-09-15 DIAGNOSIS — E66.9 OBESITY (BMI 30-39.9): ICD-10-CM

## 2022-09-15 DIAGNOSIS — R73.01 IMPAIRED FASTING BLOOD SUGAR: ICD-10-CM

## 2022-09-15 DIAGNOSIS — E55.9 VITAMIN D DEFICIENCY: ICD-10-CM

## 2022-09-15 LAB
25(OH)D3 SERPL-MCNC: 43 NG/ML (ref 30–100)
EST. AVERAGE GLUCOSE BLD GHB EST-MCNC: 117 MG/DL
FASTING STATUS PATIENT QL REPORTED: NORMAL
GLUCOSE SERPL-MCNC: 105 MG/DL (ref 65–99)
HBA1C MFR BLD: 5.7 % (ref 4–5.6)

## 2022-09-15 PROCEDURE — 82306 VITAMIN D 25 HYDROXY: CPT

## 2022-09-15 PROCEDURE — 83036 HEMOGLOBIN GLYCOSYLATED A1C: CPT

## 2022-09-15 PROCEDURE — 36415 COLL VENOUS BLD VENIPUNCTURE: CPT

## 2022-09-15 PROCEDURE — 82947 ASSAY GLUCOSE BLOOD QUANT: CPT

## 2022-09-29 ENCOUNTER — OFFICE VISIT (OUTPATIENT)
Dept: MEDICAL GROUP | Facility: PHYSICIAN GROUP | Age: 42
End: 2022-09-29
Payer: COMMERCIAL

## 2022-09-29 VITALS
TEMPERATURE: 98.3 F | DIASTOLIC BLOOD PRESSURE: 84 MMHG | SYSTOLIC BLOOD PRESSURE: 138 MMHG | OXYGEN SATURATION: 98 % | HEIGHT: 62 IN | RESPIRATION RATE: 16 BRPM | WEIGHT: 159 LBS | BODY MASS INDEX: 29.26 KG/M2 | HEART RATE: 86 BPM

## 2022-09-29 DIAGNOSIS — G89.29 CHRONIC PAIN OF RIGHT KNEE: ICD-10-CM

## 2022-09-29 DIAGNOSIS — M25.561 CHRONIC PAIN OF RIGHT KNEE: ICD-10-CM

## 2022-09-29 DIAGNOSIS — E66.9 OBESITY (BMI 30-39.9): ICD-10-CM

## 2022-09-29 DIAGNOSIS — R73.01 IMPAIRED FASTING BLOOD SUGAR: ICD-10-CM

## 2022-09-29 DIAGNOSIS — Z83.3 FAMILY HISTORY OF DIABETES MELLITUS: ICD-10-CM

## 2022-09-29 PROCEDURE — 99214 OFFICE O/P EST MOD 30 MIN: CPT | Performed by: NURSE PRACTITIONER

## 2022-09-29 RX ORDER — ORAL SEMAGLUTIDE 3 MG/1
3 TABLET ORAL DAILY
Qty: 30 TABLET | Refills: 0 | Status: SHIPPED | OUTPATIENT
Start: 2022-09-29 | End: 2022-11-08 | Stop reason: SDUPTHER

## 2022-09-29 NOTE — ASSESSMENT & PLAN NOTE
Current condition.  Patient was previously started on metformin 500 mg daily for treatment of prediabetes and to help with obesity.  Patient does have strong family history of diabetes.  Patient reports that she was having severe diarrhea with small amount of bright red blood Daily for 2 weeks due to metformin.  Patient does report history of hemorrhoids.  All diarrhea and blood has resolved since stopping metformin.  Patient denies any pain from her hemorrhoids.

## 2022-09-29 NOTE — PROGRESS NOTES
Chief Complaint   Patient presents with    Follow-Up     Follow up with labs    Medication Reaction     Pt discontinued metformin after two weeks due to possible side effect. pt believe the metformin was causing bleeding in in GI. Pt noticed bright red blood in diarrhea. Symptoms (blood and diarrhea) stopped 1 week after pt discontinued use of medication.        Subjective:     HPI:   Bernice Billings is a 41 y.o. female here to discuss the evaluation and management of:      Chronic pain of right knee  Newly assessed condition.  Patient reports for approximately 2 to 3 months she has noticed intermittent locking and instability in her right knee.  She indicates that there is a pinching pain at the front of her knee that is worse when standing on one leg.    She denies any injury or trauma.  She denies any previous history of knee pain.  She denies any swelling, redness, or buckling.    Has not needed over-the-counter analgesics.  Trys to stand it locks and gives out under her. Pinching pin in the knee when she is stands one legged.  Denies swelling, redness,       Impaired fasting blood sugar  Current condition.  Patient was previously started on metformin 500 mg daily for treatment of prediabetes and to help with obesity.  Patient does have strong family history of diabetes.  Patient reports that she was having severe diarrhea with small amount of bright red blood Daily for 2 weeks due to metformin.  Patient does report history of hemorrhoids.  All diarrhea and blood has resolved since stopping metformin.  Patient denies any pain from her hemorrhoids.        ROS:  Gen: no fevers/chills, no changes in weight  Pulm: no sob, no cough  CV: no chest pain, no palpitations  GI: Positive per HPI  MSk: Positive per HPI      Allergies   Allergen Reactions    Metformin Diarrhea     Diarrhea after starting Metformin     Shellfish Allergy      Tested positive on allergy test, and has hives with SHRIMP only       Current  "medicines (including changes today)  Current Outpatient Medications   Medication Sig Dispense Refill    Semaglutide (RYBELSUS) 3 MG Tab Take 3 mg by mouth every day. 30 Tablet 0    vitamin D2, Ergocalciferol, (DRISDOL) 1.25 MG (57313 UT) Cap capsule Take 1 Capsule by mouth every 7 days. 12 Capsule 0    losartan-hydrochlorothiazide (HYZAAR) 50-12.5 MG per tablet Take 1 Tablet by mouth every day. 90 Tablet 3    atorvastatin (LIPITOR) 20 MG Tab Take 1 Tablet by mouth every day. 90 Tablet 3    fenofibrate (TRICOR) 48 MG Tab Take 1 Tablet by mouth every day. 90 Tablet 3    TRELEGY ELLIPTA 200-62.5-25 MCG/INH AEROSOL POWDER, BREATH ACTIVATED Inhale 200 mcg every day.      albuterol 108 (90 Base) MCG/ACT Aero Soln inhalation aerosol Inhale 2 Puffs every four hours as needed. With spacer device 18.2 g 0    Multiple Vitamin (MULTI-VITAMIN PO) Take  by mouth every day.      Albuterol Sulfate (PROAIR HFA INH) Inhale  by mouth as needed.      loratadine (CLARITIN) 10 MG Tab Take 10 mg by mouth every day.      metFORMIN ER (GLUCOPHAGE XR) 500 MG TABLET SR 24 HR Take 1 Tablet by mouth every day. (Patient not taking: Reported on 9/29/2022) 90 Tablet 1     No current facility-administered medications for this visit.          Objective:       /84 (BP Location: Right arm, Patient Position: Sitting, BP Cuff Size: Adult)   Pulse 86   Temp 36.8 °C (98.3 °F) (Temporal)   Resp 16   Ht 1.562 m (5' 1.5\")   Wt 72.1 kg (159 lb)   SpO2 98%  Body mass index is 29.56 kg/m².    Physical Exam:  Constitutional: Well-developed and well-nourished female in NAD. Not diaphoretic. No distress.   Skin: warm, dry, intact  Cardiovascular: Regular rate and rhythm without murmur. Radial pulses are intact and equal bilaterally.  Pulmonary: Clear to ausculation. Normal effort. No rales, ronchi, or wheezing.  Abdomen: Soft, non tender, and without distention. Active bowel sounds in all four quadrants.   Extremities: No cyanosis, clubbing, erythema, " nor edema.   Right knee: Full ROM, full strength, TTP on anterior joint line and patellar tendon, edema negative, varus/valgus stress negative, anterior/posterior drawer negative.  Neurological: Alert and oriented x 3.   Psychiatric:  Behavior, mood, and affect are appropriate.      Assessment and Plan:     The following treatment plan was discussed:      1. Impaired fasting blood sugar  Chronic condition with medication side effect.  Patient will discontinue use of metformin and trial of Rybelsus sent to pharmacy.  Discussed with patient's side effects to medication.  We will continue monitor future appointments.  - Semaglutide (RYBELSUS) 3 MG Tab; Take 3 mg by mouth every day.  Dispense: 30 Tablet; Refill: 0    2. Obesity (BMI 30-39.9)  Chronic condition.  Trial of Rybelsus sent to pharmacy.  Discussed appropriate diet lifestyle modifications.  - Semaglutide (RYBELSUS) 3 MG Tab; Take 3 mg by mouth every day.  Dispense: 30 Tablet; Refill: 0    3. Family history of diabetes mellitus  - Semaglutide (RYBELSUS) 3 MG Tab; Take 3 mg by mouth every day.  Dispense: 30 Tablet; Refill: 0    4. Chronic pain of right knee  Chronic condition with progression.  Patient has had increased knee locking over the past several months.  Discussed with patient multiple differential diagnosis included but not limited to possible osteoarthritis, meniscal tear, or ligamental injury.  Knee x-ray ordered today, unfortunately our x-ray machine is not currently working in clinic.  Recommended patient go to [facility] or [place] clinic to obtain x-ray.  Patient was agreeable for trial of  Physical therapy.  May use over-the-counter analgesics such as Tylenol as ibuprofen.  recommended cryotherapy nightly.  Will follow-up in clinic in 7-8 weeks if symptoms continue.  - DX-KNEE 3 VIEWS RIGHT; Future  - Referral to Physical Therapy      Any change or worsening of signs or symptoms, patient encouraged to follow-up or report to urgent care or emergency  room for further evaluation. Patient verbalizes understanding and agrees.    Follow-Up: Return if symptoms worsen or fail to improve.      PLEASE NOTE: This dictation was created using voice recognition software. I have made every reasonable attempt to correct obvious errors, but I expect that there are errors of grammar and possibly content that I did not discover before finalizing the note.

## 2022-09-29 NOTE — ASSESSMENT & PLAN NOTE
Newly assessed condition.  Patient reports for approximately 2 to 3 months she has noticed intermittent locking and instability in her right knee.  She indicates that there is a pinching pain at the front of her knee that is worse when standing on one leg.    She denies any injury or trauma.  She denies any previous history of knee pain.  She denies any swelling, redness, or buckling.    Has not needed over-the-counter analgesics.  Trys to stand it locks and gives out under her. Pinching pin in the knee when she is stands one legged.  Denies swelling, redness,

## 2022-10-04 DIAGNOSIS — R73.01 IMPAIRED FASTING BLOOD SUGAR: ICD-10-CM

## 2022-10-04 DIAGNOSIS — E66.9 OBESITY (BMI 30-39.9): ICD-10-CM

## 2022-10-05 RX ORDER — METFORMIN HYDROCHLORIDE 500 MG/1
TABLET, EXTENDED RELEASE ORAL
Qty: 90 TABLET | Refills: 1 | Status: SHIPPED | OUTPATIENT
Start: 2022-10-05 | End: 2023-06-01

## 2022-10-05 NOTE — TELEPHONE ENCOUNTER
Received request via: Pharmacy    Was the patient seen in the last year in this department? Yes    Does the patient have an active prescription (recently filled or refills available) for medication(s) requested? No    Last OV 9/29/2022  Next OV None w/PCP  Last Labs 9//15/2022

## 2022-11-08 ENCOUNTER — PATIENT MESSAGE (OUTPATIENT)
Dept: MEDICAL GROUP | Facility: PHYSICIAN GROUP | Age: 42
End: 2022-11-08
Payer: COMMERCIAL

## 2022-11-30 DIAGNOSIS — E66.9 OBESITY (BMI 30-39.9): ICD-10-CM

## 2022-11-30 DIAGNOSIS — R73.01 IMPAIRED FASTING BLOOD SUGAR: ICD-10-CM

## 2022-11-30 DIAGNOSIS — Z83.3 FAMILY HISTORY OF DIABETES MELLITUS: ICD-10-CM

## 2022-11-30 RX ORDER — ORAL SEMAGLUTIDE 3 MG/1
3 TABLET ORAL DAILY
Qty: 30 TABLET | Refills: 3 | Status: CANCELLED | OUTPATIENT
Start: 2022-11-30

## 2022-11-30 RX ORDER — ORAL SEMAGLUTIDE 3 MG/1
3 TABLET ORAL DAILY
Qty: 30 TABLET | Refills: 3 | Status: SHIPPED | OUTPATIENT
Start: 2022-11-30 | End: 2022-12-01 | Stop reason: SDUPTHER

## 2022-12-01 RX ORDER — ORAL SEMAGLUTIDE 3 MG/1
3 TABLET ORAL DAILY
Qty: 30 TABLET | Refills: 3 | Status: SHIPPED | OUTPATIENT
Start: 2022-12-01 | End: 2023-05-03 | Stop reason: SDUPTHER

## 2023-01-16 DIAGNOSIS — I10 ESSENTIAL HYPERTENSION: ICD-10-CM

## 2023-01-16 DIAGNOSIS — E78.5 HYPERLIPIDEMIA, UNSPECIFIED HYPERLIPIDEMIA TYPE: ICD-10-CM

## 2023-01-16 NOTE — TELEPHONE ENCOUNTER
Received request via: Pharmacy    Was the patient seen in the last year in this department? Yes    Does the patient have an active prescription (recently filled or refills available) for medication(s) requested? No    Does the patient have custodial Plus and need 100 day supply (blood pressure, diabetes and cholesterol meds only)? Patient does not have SCP      Last office Visit:09/29/2022  Last Labs:09/15/2022

## 2023-01-17 RX ORDER — LOSARTAN POTASSIUM AND HYDROCHLOROTHIAZIDE 12.5; 5 MG/1; MG/1
TABLET ORAL
Qty: 90 TABLET | Refills: 3 | Status: SHIPPED | OUTPATIENT
Start: 2023-01-17 | End: 2024-02-28

## 2023-01-17 RX ORDER — FENOFIBRATE 48 MG/1
TABLET, COATED ORAL
Qty: 90 TABLET | Refills: 3 | Status: SHIPPED | OUTPATIENT
Start: 2023-01-17 | End: 2024-03-07 | Stop reason: SDUPTHER

## 2023-03-05 DIAGNOSIS — E78.5 HYPERLIPIDEMIA, UNSPECIFIED HYPERLIPIDEMIA TYPE: ICD-10-CM

## 2023-03-06 RX ORDER — ATORVASTATIN CALCIUM 20 MG/1
TABLET, FILM COATED ORAL
Qty: 90 TABLET | Refills: 3 | Status: SHIPPED | OUTPATIENT
Start: 2023-03-06 | End: 2024-01-03 | Stop reason: SDUPTHER

## 2023-03-06 NOTE — TELEPHONE ENCOUNTER
Received request via: Pharmacy    Was the patient seen in the last year in this department? Yes    Does the patient have an active prescription (recently filled or refills available) for medication(s) requested? No    Does the patient have USP Plus and need 100 day supply (blood pressure, diabetes and cholesterol meds only)? Patient does not have SCP      Last office visit;09/29/2022  Last Labs:09/15/2022

## 2023-04-08 ENCOUNTER — TELEPHONE (OUTPATIENT)
Dept: URGENT CARE | Facility: PHYSICIAN GROUP | Age: 43
End: 2023-04-08
Payer: COMMERCIAL

## 2023-05-03 ENCOUNTER — APPOINTMENT (OUTPATIENT)
Dept: MEDICAL GROUP | Facility: PHYSICIAN GROUP | Age: 43
End: 2023-05-03
Payer: COMMERCIAL

## 2023-05-03 ENCOUNTER — APPOINTMENT (OUTPATIENT)
Dept: URGENT CARE | Facility: PHYSICIAN GROUP | Age: 43
End: 2023-05-03
Payer: COMMERCIAL

## 2023-05-03 DIAGNOSIS — R73.01 IMPAIRED FASTING BLOOD SUGAR: ICD-10-CM

## 2023-05-03 DIAGNOSIS — Z83.3 FAMILY HISTORY OF DIABETES MELLITUS: ICD-10-CM

## 2023-05-03 DIAGNOSIS — E66.9 OBESITY (BMI 30-39.9): ICD-10-CM

## 2023-05-03 RX ORDER — ORAL SEMAGLUTIDE 3 MG/1
3 TABLET ORAL DAILY
Qty: 30 TABLET | Refills: 0 | Status: SHIPPED | OUTPATIENT
Start: 2023-05-03 | End: 2023-06-01 | Stop reason: SDUPTHER

## 2023-05-03 NOTE — TELEPHONE ENCOUNTER
Received request via: Patient    Was the patient seen in the last year in this department? Yes    Does the patient have an active prescription (recently filled or refills available) for medication(s) requested? No    Does the patient have FPC Plus and need 100 day supply (blood pressure, diabetes and cholesterol meds only)? Patient does not have SCP      Pt was checked in and needs a med refill. Has been rescheduled to June.

## 2023-05-31 SDOH — HEALTH STABILITY: PHYSICAL HEALTH: ON AVERAGE, HOW MANY DAYS PER WEEK DO YOU ENGAGE IN MODERATE TO STRENUOUS EXERCISE (LIKE A BRISK WALK)?: 0 DAYS

## 2023-05-31 SDOH — ECONOMIC STABILITY: FOOD INSECURITY: WITHIN THE PAST 12 MONTHS, YOU WORRIED THAT YOUR FOOD WOULD RUN OUT BEFORE YOU GOT MONEY TO BUY MORE.: NEVER TRUE

## 2023-05-31 SDOH — ECONOMIC STABILITY: HOUSING INSECURITY
IN THE LAST 12 MONTHS, WAS THERE A TIME WHEN YOU DID NOT HAVE A STEADY PLACE TO SLEEP OR SLEPT IN A SHELTER (INCLUDING NOW)?: NO

## 2023-05-31 SDOH — HEALTH STABILITY: PHYSICAL HEALTH: ON AVERAGE, HOW MANY MINUTES DO YOU ENGAGE IN EXERCISE AT THIS LEVEL?: 0 MIN

## 2023-05-31 SDOH — ECONOMIC STABILITY: TRANSPORTATION INSECURITY
IN THE PAST 12 MONTHS, HAS LACK OF RELIABLE TRANSPORTATION KEPT YOU FROM MEDICAL APPOINTMENTS, MEETINGS, WORK OR FROM GETTING THINGS NEEDED FOR DAILY LIVING?: NO

## 2023-05-31 SDOH — ECONOMIC STABILITY: INCOME INSECURITY: IN THE LAST 12 MONTHS, WAS THERE A TIME WHEN YOU WERE NOT ABLE TO PAY THE MORTGAGE OR RENT ON TIME?: NO

## 2023-05-31 SDOH — HEALTH STABILITY: MENTAL HEALTH
STRESS IS WHEN SOMEONE FEELS TENSE, NERVOUS, ANXIOUS, OR CAN'T SLEEP AT NIGHT BECAUSE THEIR MIND IS TROUBLED. HOW STRESSED ARE YOU?: TO SOME EXTENT

## 2023-05-31 SDOH — ECONOMIC STABILITY: FOOD INSECURITY: WITHIN THE PAST 12 MONTHS, THE FOOD YOU BOUGHT JUST DIDN'T LAST AND YOU DIDN'T HAVE MONEY TO GET MORE.: NEVER TRUE

## 2023-05-31 SDOH — ECONOMIC STABILITY: INCOME INSECURITY: HOW HARD IS IT FOR YOU TO PAY FOR THE VERY BASICS LIKE FOOD, HOUSING, MEDICAL CARE, AND HEATING?: SOMEWHAT HARD

## 2023-05-31 SDOH — ECONOMIC STABILITY: TRANSPORTATION INSECURITY
IN THE PAST 12 MONTHS, HAS LACK OF TRANSPORTATION KEPT YOU FROM MEETINGS, WORK, OR FROM GETTING THINGS NEEDED FOR DAILY LIVING?: NO

## 2023-05-31 SDOH — ECONOMIC STABILITY: HOUSING INSECURITY: IN THE LAST 12 MONTHS, HOW MANY PLACES HAVE YOU LIVED?: 1

## 2023-05-31 SDOH — ECONOMIC STABILITY: TRANSPORTATION INSECURITY
IN THE PAST 12 MONTHS, HAS THE LACK OF TRANSPORTATION KEPT YOU FROM MEDICAL APPOINTMENTS OR FROM GETTING MEDICATIONS?: NO

## 2023-05-31 ASSESSMENT — LIFESTYLE VARIABLES
HOW OFTEN DO YOU HAVE A DRINK CONTAINING ALCOHOL: 2-3 TIMES A WEEK
HOW OFTEN DO YOU HAVE SIX OR MORE DRINKS ON ONE OCCASION: LESS THAN MONTHLY
SKIP TO QUESTIONS 9-10: 0
HOW MANY STANDARD DRINKS CONTAINING ALCOHOL DO YOU HAVE ON A TYPICAL DAY: 1 OR 2
AUDIT-C TOTAL SCORE: 4

## 2023-05-31 ASSESSMENT — SOCIAL DETERMINANTS OF HEALTH (SDOH)
HOW OFTEN DO YOU HAVE SIX OR MORE DRINKS ON ONE OCCASION: LESS THAN MONTHLY
HOW OFTEN DO YOU HAVE A DRINK CONTAINING ALCOHOL: 2-3 TIMES A WEEK
WITHIN THE PAST 12 MONTHS, YOU WORRIED THAT YOUR FOOD WOULD RUN OUT BEFORE YOU GOT THE MONEY TO BUY MORE: NEVER TRUE
HOW OFTEN DO YOU ATTENT MEETINGS OF THE CLUB OR ORGANIZATION YOU BELONG TO?: NEVER
IN A TYPICAL WEEK, HOW MANY TIMES DO YOU TALK ON THE PHONE WITH FAMILY, FRIENDS, OR NEIGHBORS?: ONCE A WEEK
DO YOU BELONG TO ANY CLUBS OR ORGANIZATIONS SUCH AS CHURCH GROUPS UNIONS, FRATERNAL OR ATHLETIC GROUPS, OR SCHOOL GROUPS?: NO
HOW MANY DRINKS CONTAINING ALCOHOL DO YOU HAVE ON A TYPICAL DAY WHEN YOU ARE DRINKING: 1 OR 2
HOW OFTEN DO YOU ATTENT MEETINGS OF THE CLUB OR ORGANIZATION YOU BELONG TO?: NEVER
HOW HARD IS IT FOR YOU TO PAY FOR THE VERY BASICS LIKE FOOD, HOUSING, MEDICAL CARE, AND HEATING?: SOMEWHAT HARD
HOW OFTEN DO YOU GET TOGETHER WITH FRIENDS OR RELATIVES?: NEVER
HOW OFTEN DO YOU GET TOGETHER WITH FRIENDS OR RELATIVES?: NEVER
DO YOU BELONG TO ANY CLUBS OR ORGANIZATIONS SUCH AS CHURCH GROUPS UNIONS, FRATERNAL OR ATHLETIC GROUPS, OR SCHOOL GROUPS?: NO
HOW OFTEN DO YOU ATTEND CHURCH OR RELIGIOUS SERVICES?: NEVER
HOW OFTEN DO YOU ATTEND CHURCH OR RELIGIOUS SERVICES?: NEVER
IN A TYPICAL WEEK, HOW MANY TIMES DO YOU TALK ON THE PHONE WITH FAMILY, FRIENDS, OR NEIGHBORS?: ONCE A WEEK

## 2023-06-01 ENCOUNTER — OFFICE VISIT (OUTPATIENT)
Dept: MEDICAL GROUP | Facility: PHYSICIAN GROUP | Age: 43
End: 2023-06-01
Payer: COMMERCIAL

## 2023-06-01 VITALS
DIASTOLIC BLOOD PRESSURE: 86 MMHG | TEMPERATURE: 96.8 F | HEART RATE: 80 BPM | BODY MASS INDEX: 30.18 KG/M2 | OXYGEN SATURATION: 99 % | SYSTOLIC BLOOD PRESSURE: 124 MMHG | HEIGHT: 62 IN | WEIGHT: 164 LBS

## 2023-06-01 DIAGNOSIS — R73.01 IMPAIRED FASTING BLOOD SUGAR: ICD-10-CM

## 2023-06-01 DIAGNOSIS — Z83.3 FAMILY HISTORY OF DIABETES MELLITUS: ICD-10-CM

## 2023-06-01 DIAGNOSIS — J30.1 NON-SEASONAL ALLERGIC RHINITIS DUE TO POLLEN: ICD-10-CM

## 2023-06-01 DIAGNOSIS — Z76.89 ENCOUNTER TO ESTABLISH CARE: ICD-10-CM

## 2023-06-01 DIAGNOSIS — E78.5 HYPERLIPIDEMIA, UNSPECIFIED HYPERLIPIDEMIA TYPE: ICD-10-CM

## 2023-06-01 DIAGNOSIS — Z11.59 NEED FOR HEPATITIS C SCREENING TEST: ICD-10-CM

## 2023-06-01 DIAGNOSIS — F17.200 NICOTINE DEPENDENCE WITH CURRENT USE: ICD-10-CM

## 2023-06-01 DIAGNOSIS — E55.9 VITAMIN D DEFICIENCY: ICD-10-CM

## 2023-06-01 DIAGNOSIS — E66.9 OBESITY (BMI 30-39.9): ICD-10-CM

## 2023-06-01 DIAGNOSIS — I10 PRIMARY HYPERTENSION: ICD-10-CM

## 2023-06-01 PROCEDURE — 3074F SYST BP LT 130 MM HG: CPT | Performed by: NURSE PRACTITIONER

## 2023-06-01 PROCEDURE — 99204 OFFICE O/P NEW MOD 45 MIN: CPT | Performed by: NURSE PRACTITIONER

## 2023-06-01 PROCEDURE — 3079F DIAST BP 80-89 MM HG: CPT | Performed by: NURSE PRACTITIONER

## 2023-06-01 RX ORDER — ORAL SEMAGLUTIDE 3 MG/1
3 TABLET ORAL DAILY
Qty: 30 TABLET | Refills: 1 | Status: SHIPPED | OUTPATIENT
Start: 2023-06-01 | End: 2023-07-19 | Stop reason: SDUPTHER

## 2023-06-01 ASSESSMENT — PATIENT HEALTH QUESTIONNAIRE - PHQ9: CLINICAL INTERPRETATION OF PHQ2 SCORE: 0

## 2023-06-01 NOTE — PROGRESS NOTES
Subjective:     CC:  Diagnoses of Encounter to establish care, Primary hypertension, Hyperlipidemia, unspecified hyperlipidemia type, Impaired fasting blood sugar, Non-seasonal allergic rhinitis due to pollen, Nicotine dependence with current use, Obesity (BMI 30-39.9), Family history of diabetes mellitus, Vitamin D deficiency, and Need for hepatitis C screening test were pertinent to this visit.    HISTORY OF THE PRESENT ILLNESS: Patient is a 42 y.o. female. This pleasant patient is here today to establish care and discuss the following. Her prior PCP was DAT Díaz.    Impaired fasting blood sugar  Last A1C 5.7% with glucose of 105. Patient taking semaglutide 3 mg daily. Previously intolerant of metformin, caused diarrhea. Due for updated labs.     Primary hypertension  Her blood pressure today is 142/80. She does not check her blood pressure at home. She is taking losartan-hydrochlorothiazide 50-12.5 mg daily.    Hyperlipidemia  She is taking fenofibrate 48 mg daily and atorvastatin 20 mg daily. She is not exercising. She is eating chicken, meat, fruits, vegetables, seeds, nuts, fried food, occasional fast food once a week.     Vitamin D deficiency  She completed ergocalciferol course. No current vitamin D supplementation. Due for updated labs.     Allergic rhinitis due to pollen  Taking OTC Claritin 10 mg daily. She gets monthly allergy injections from [facility] in [place]. Reports was started on Trelegy Ellipta 2 years ago for allergies. She uses albuterol inhaler as needed.     Nicotine dependence with current use  She is smoking 1 pack of cigarettes a week. She has been smoking since age 14.  She has quit in the past with her pregnancies.  Not interested in quitting today.    Obesity (BMI 30-39.9)  Her BMI today is 30.00 kg/m².      Allergies: Metformin and Shellfish allergy    Current Outpatient Medications Ordered in Epic   Medication Sig Dispense Refill    Semaglutide (RYBELSUS) 3 MG Tab Take 3  mg by mouth every day. 30 Tablet 1    atorvastatin (LIPITOR) 20 MG Tab TAKE 1 TABLET DAILY 90 Tablet 3    losartan-hydrochlorothiazide (HYZAAR) 50-12.5 MG per tablet TAKE 1 TABLET DAILY 90 Tablet 3    fenofibrate (TRICOR) 48 MG Tab TAKE 1 TABLET DAILY 90 Tablet 3    vitamin D2, Ergocalciferol, (DRISDOL) 1.25 MG (78643 UT) Cap capsule Take 1 Capsule by mouth every 7 days. 12 Capsule 0    TRELEGY ELLIPTA 200-62.5-25 MCG/INH AEROSOL POWDER, BREATH ACTIVATED Inhale 200 mcg every day.      albuterol 108 (90 Base) MCG/ACT Aero Soln inhalation aerosol Inhale 2 Puffs every four hours as needed. With spacer device 18.2 g 0    Multiple Vitamin (MULTI-VITAMIN PO) Take  by mouth every day.      loratadine (CLARITIN) 10 MG Tab Take 10 mg by mouth every day.       No current [facility]-ordered facility-administered medications on file.       Past Medical History:   Diagnosis Date    Allergic rhinitis due to pollen 6/24/2015    Asthma     Diabetes (HCC)     gestational only    Hives 1/6/2015    Hyperlipidemia 2/11/2015    Hypertension 1/6/2015    Onychomycosis 6/24/2015       Past Surgical History:   Procedure Laterality Date    PELVISCOPY N/A 2/8/2018    Procedure: PELVISCOPY;  Surgeon: Alina Cherry M.D.;  Location: SURGERY SAME DAY [facility];  Service: Gynecology    SALPINGECTOMY Bilateral 2/8/2018    Procedure: SALPINGECTOMY;  Surgeon: Alina Cherry M.D.;  Location: SURGERY SAME DAY [facility];  Service: Gynecology    TUBAL COAGULATION LAPAROSCOPIC BILATERAL         Social History     Tobacco Use    Smoking status: Some Days     Packs/day: 0.10     Years: 25.00     Pack years: 2.50     Types: Cigarettes    Smokeless tobacco: Never    Tobacco comments:     1 pack per week   Vaping Use    Vaping Use: Never used   Substance Use Topics    Alcohol use: Not Currently     Comment: 1-2 beers a couple times a week    Drug use: No       Social History     Social History Narrative    Not on file       Family History   Problem Relation Age  "of Onset    Hypertension Mother     Diabetes Father     Hypertension Father     Heart Disease Father     No Known Problems Brother     No Known Problems Brother     Ovarian Cancer Maternal Grandmother     Lung Cancer Maternal Grandfather     No Known Problems Paternal Grandmother     No Known Problems Paternal Grandfather     No Known Problems Daughter     No Known Problems Daughter        Health Maintenance: Reviewed. Pap scheduled 6/29/2023.  Discussed hepatitis B vaccine and she would like to think about.  Discussed that she can obtain her COVID booster at the pharmacy.  Hepatitis C screen ordered.        Objective:     Vital signs reviewed  Exam: /86 (BP Location: Left arm, Patient Position: Sitting)   Pulse 80   Temp 36 °C (96.8 °F) (Temporal)   Ht 1.575 m (5' 2\")   Wt 74.4 kg (164 lb)   SpO2 99%  Body mass index is 30 kg/m².    Gen: Alert and oriented, No apparent distress.  Neck: Neck is supple without lymphadenopathy.  Lungs: Normal effort, CTA bilaterally, no wheezes, rhonchi, or rales.    CV: Regular rate and rhythm. No murmurs, rubs, or gallops.      Assessment & Plan:   42 y.o. female with the following -    1. Encounter to establish care  Acute uncomplicated problem.  Care established today.  Health maintenance discussed.  Pap is scheduled.  She like to think about her hepatitis B vaccine.    2. Primary hypertension  Chronic stable problem.  On repeat by me her blood pressure is 124/86.  She is due for updated labs.  Continue losartan-hydrochlorothiazide 50-12.5 mg daily.  - CBC WITH DIFFERENTIAL; Future  - Comp Metabolic Panel; Future  - TSH WITH REFLEX TO FT4; Future    3. Hyperlipidemia, unspecified hyperlipidemia type  Chronic stable problem.  Due for updated labs.  Continue fenofibrate 48 mg daily and atorvastatin 20 mg daily.  - Comp Metabolic Panel; Future  - Lipid Profile; Future    4. Impaired fasting blood sugar  Chronic stable problem.  Continue semaglutide 3 mg daily.  Medication " refill today.  Due for updated A1c.  - HEMOGLOBIN A1C; Future  - Semaglutide (RYBELSUS) 3 MG Tab; Take 3 mg by mouth every day.  Dispense: 30 Tablet; Refill: 1    5. Non-seasonal allergic rhinitis due to pollen  Chronic stable problem.  Continue follow-up with allergist.  Continue with Trelegy Ellipta 200 mcg daily and albuterol inhaler as needed.    6. Nicotine dependence with current use  Chronic stable problem.  Not interested in cessation today.    7. Obesity (BMI 30-39.9)  Chronic stable problem.  Continue with semaglutide 3 mg daily.  - Semaglutide (RYBELSUS) 3 MG Tab; Take 3 mg by mouth every day.  Dispense: 30 Tablet; Refill: 1  -Patient identified as having weight management issue.  Appropriate orders and counseling given.    8. Family history of diabetes mellitus  Chronic stable problem.  Continue semaglutide 3 mg daily.  - Semaglutide (RYBELSUS) 3 MG Tab; Take 3 mg by mouth every day.  Dispense: 30 Tablet; Refill: 1    9. Vitamin D deficiency  Chronic stable problem.  Due for updated labs.  - VITAMIN D,25 HYDROXY (DEFICIENCY); Future    10. Need for hepatitis C screening test  Acute uncomplicated problem.  Discussed screening and she is in agreement.  - HEP C VIRUS ANTIBODY; Future      Return for annual with pap.    Please note that this dictation was created using voice recognition software. I have made every reasonable attempt to correct obvious errors, but I expect that there are errors of grammar and possibly content that I did not discover before finalizing the note.

## 2023-06-01 NOTE — LETTER
[facility]  ANGEL Bal  [address]  [place] [address]  Fax: [phone]   Authorization for Release/Disclosure of   Protected Health Information   Name: JUANITO HOLLEY : 1980 SSN: [id number]   Address: [address] [place], [place] [address]or [facility] [address] Phone:    [phone] (home) [phone] (work)   I authorize the entity listed below to release/disclose the PHI below to:   [facility]/ANGEL Bal and ANGEL Bal   Provider or Entity Name:  [facility]   Address   City, State, Zip  [place] Phone:      Fax:     Reason for request: continuity of care   Information to be released:    [  ] LAST COLONOSCOPY,  including any PATH REPORT and follow-up  [  ] LAST FIT/COLOGUARD RESULT [  ] LAST DEXA  [  ] LAST MAMMOGRAM  [  ] LAST PAP  [  ] LAST LABS [  ] RETINA EXAM REPORT  [  ] IMMUNIZATION RECORDS  [  ] Release all info      [  ] Check here and initial the line next to each item to release ALL health information INCLUDING  _____ Care and treatment for drug and / or alcohol abuse  _____ HIV testing, infection status, or AIDS  _____ Genetic Testing    DATES OF SERVICE OR TIME PERIOD TO BE DISCLOSED: _____________  I understand and acknowledge that:  * This Authorization may be revoked at any time by you in writing, except if your health information has already been used or disclosed.  * Your health information that will be used or disclosed as a result of you signing this authorization could be re-disclosed by the recipient. If this occurs, your re-disclosed health information may no longer be protected by State or Federal laws.  * You may refuse to sign this Authorization. Your refusal will not affect your ability to obtain treatment.  * This Authorization becomes effective upon signing and will  on (date) __________.      If no date is indicated, this Authorization will  one (1) year from the signature date.    Name: Juanito  Awa  Signature: Date:   6/1/2023     PLEASE FAX REQUESTED RECORDS BACK TO: [phone]

## 2023-06-01 NOTE — ASSESSMENT & PLAN NOTE
Last A1C 5.7% with glucose of 105. Patient taking semaglutide 3 mg daily. Previously intolerant of metformin, caused diarrhea. Due for updated labs.

## 2023-06-01 NOTE — ASSESSMENT & PLAN NOTE
She is smoking 1 pack of cigarettes a week. She has been smoking since age 14.  She has quit in the past with her pregnancies.  Not interested in quitting today.

## 2023-06-01 NOTE — ASSESSMENT & PLAN NOTE
She is taking fenofibrate 48 mg daily and atorvastatin 20 mg daily. She is not exercising. She is eating chicken, meat, fruits, vegetables, seeds, nuts, fried food, occasional fast food once a week.

## 2023-06-01 NOTE — ASSESSMENT & PLAN NOTE
Taking OTC Claritin 10 mg daily. She gets monthly allergy injections from [facility] in [place]. Reports was started on Trelegy Ellipta 2 years ago for allergies. She uses albuterol inhaler as needed.

## 2023-06-01 NOTE — ASSESSMENT & PLAN NOTE
Her blood pressure today is 142/80. She does not check her blood pressure at home. She is taking losartan-hydrochlorothiazide 50-12.5 mg daily.

## 2023-07-11 ENCOUNTER — HOSPITAL ENCOUNTER (OUTPATIENT)
Dept: LAB | Facility: MEDICAL CENTER | Age: 43
End: 2023-07-11
Attending: NURSE PRACTITIONER
Payer: COMMERCIAL

## 2023-07-11 DIAGNOSIS — E78.5 HYPERLIPIDEMIA, UNSPECIFIED HYPERLIPIDEMIA TYPE: ICD-10-CM

## 2023-07-11 DIAGNOSIS — E55.9 VITAMIN D DEFICIENCY: ICD-10-CM

## 2023-07-11 DIAGNOSIS — Z11.59 NEED FOR HEPATITIS C SCREENING TEST: ICD-10-CM

## 2023-07-11 DIAGNOSIS — R73.01 IMPAIRED FASTING BLOOD SUGAR: ICD-10-CM

## 2023-07-11 DIAGNOSIS — I10 PRIMARY HYPERTENSION: ICD-10-CM

## 2023-07-11 LAB
BASOPHILS # BLD AUTO: 0.9 % (ref 0–1.8)
BASOPHILS # BLD: 0.07 K/UL (ref 0–0.12)
EOSINOPHIL # BLD AUTO: 0.53 K/UL (ref 0–0.51)
EOSINOPHIL NFR BLD: 6.7 % (ref 0–6.9)
ERYTHROCYTE [DISTWIDTH] IN BLOOD BY AUTOMATED COUNT: 49.9 FL (ref 35.9–50)
EST. AVERAGE GLUCOSE BLD GHB EST-MCNC: 128 MG/DL
HBA1C MFR BLD: 6.1 % (ref 4–5.6)
HCT VFR BLD AUTO: 37.4 % (ref 37–47)
HGB BLD-MCNC: 11.7 G/DL (ref 12–16)
IMM GRANULOCYTES # BLD AUTO: 0.04 K/UL (ref 0–0.11)
IMM GRANULOCYTES NFR BLD AUTO: 0.5 % (ref 0–0.9)
LYMPHOCYTES # BLD AUTO: 1.72 K/UL (ref 1–4.8)
LYMPHOCYTES NFR BLD: 21.6 % (ref 22–41)
MCH RBC QN AUTO: 24.8 PG (ref 27–33)
MCHC RBC AUTO-ENTMCNC: 31.3 G/DL (ref 32.2–35.5)
MCV RBC AUTO: 79.4 FL (ref 81.4–97.8)
MONOCYTES # BLD AUTO: 0.53 K/UL (ref 0–0.85)
MONOCYTES NFR BLD AUTO: 6.7 % (ref 0–13.4)
NEUTROPHILS # BLD AUTO: 5.07 K/UL (ref 1.82–7.42)
NEUTROPHILS NFR BLD: 63.6 % (ref 44–72)
NRBC # BLD AUTO: 0 K/UL
NRBC BLD-RTO: 0 /100 WBC (ref 0–0.2)
PLATELET # BLD AUTO: 339 K/UL (ref 164–446)
PMV BLD AUTO: 12.6 FL (ref 9–12.9)
RBC # BLD AUTO: 4.71 M/UL (ref 4.2–5.4)
WBC # BLD AUTO: 8 K/UL (ref 4.8–10.8)

## 2023-07-11 PROCEDURE — 83036 HEMOGLOBIN GLYCOSYLATED A1C: CPT

## 2023-07-11 PROCEDURE — 82306 VITAMIN D 25 HYDROXY: CPT

## 2023-07-11 PROCEDURE — 86803 HEPATITIS C AB TEST: CPT

## 2023-07-11 PROCEDURE — 36415 COLL VENOUS BLD VENIPUNCTURE: CPT

## 2023-07-11 PROCEDURE — 84443 ASSAY THYROID STIM HORMONE: CPT

## 2023-07-11 PROCEDURE — 80053 COMPREHEN METABOLIC PANEL: CPT

## 2023-07-11 PROCEDURE — 80061 LIPID PANEL: CPT

## 2023-07-11 PROCEDURE — 85025 COMPLETE CBC W/AUTO DIFF WBC: CPT

## 2023-07-12 LAB
25(OH)D3 SERPL-MCNC: 35 NG/ML (ref 30–100)
ALBUMIN SERPL BCP-MCNC: 4.5 G/DL (ref 3.2–4.9)
ALBUMIN/GLOB SERPL: 1.3 G/DL
ALP SERPL-CCNC: 58 U/L (ref 30–99)
ALT SERPL-CCNC: 14 U/L (ref 2–50)
ANION GAP SERPL CALC-SCNC: 15 MMOL/L (ref 7–16)
AST SERPL-CCNC: 16 U/L (ref 12–45)
BILIRUB SERPL-MCNC: 0.3 MG/DL (ref 0.1–1.5)
BUN SERPL-MCNC: 11 MG/DL (ref 8–22)
CALCIUM ALBUM COR SERPL-MCNC: 8.8 MG/DL (ref 8.5–10.5)
CALCIUM SERPL-MCNC: 9.2 MG/DL (ref 8.5–10.5)
CHLORIDE SERPL-SCNC: 104 MMOL/L (ref 96–112)
CHOLEST SERPL-MCNC: 166 MG/DL (ref 100–199)
CO2 SERPL-SCNC: 23 MMOL/L (ref 20–33)
CREAT SERPL-MCNC: 0.5 MG/DL (ref 0.5–1.4)
FASTING STATUS PATIENT QL REPORTED: NORMAL
GFR SERPLBLD CREATININE-BSD FMLA CKD-EPI: 118 ML/MIN/1.73 M 2
GLOBULIN SER CALC-MCNC: 3.4 G/DL (ref 1.9–3.5)
GLUCOSE SERPL-MCNC: 96 MG/DL (ref 65–99)
HCV AB SER QL: NORMAL
HDLC SERPL-MCNC: 49 MG/DL
LDLC SERPL CALC-MCNC: 78 MG/DL
POTASSIUM SERPL-SCNC: 4.6 MMOL/L (ref 3.6–5.5)
PROT SERPL-MCNC: 7.9 G/DL (ref 6–8.2)
SODIUM SERPL-SCNC: 142 MMOL/L (ref 135–145)
TRIGL SERPL-MCNC: 197 MG/DL (ref 0–149)
TSH SERPL DL<=0.005 MIU/L-ACNC: 1.17 UIU/ML (ref 0.38–5.33)

## 2023-07-19 ENCOUNTER — OFFICE VISIT (OUTPATIENT)
Dept: MEDICAL GROUP | Facility: PHYSICIAN GROUP | Age: 43
End: 2023-07-19
Payer: COMMERCIAL

## 2023-07-19 ENCOUNTER — HOSPITAL ENCOUNTER (OUTPATIENT)
Facility: MEDICAL CENTER | Age: 43
End: 2023-07-19
Attending: NURSE PRACTITIONER
Payer: COMMERCIAL

## 2023-07-19 VITALS
TEMPERATURE: 97.3 F | HEART RATE: 75 BPM | OXYGEN SATURATION: 98 % | BODY MASS INDEX: 30.55 KG/M2 | WEIGHT: 166 LBS | HEIGHT: 62 IN | SYSTOLIC BLOOD PRESSURE: 118 MMHG | DIASTOLIC BLOOD PRESSURE: 80 MMHG

## 2023-07-19 DIAGNOSIS — Z01.419 WELL WOMAN EXAM WITH ROUTINE GYNECOLOGICAL EXAM: ICD-10-CM

## 2023-07-19 DIAGNOSIS — E66.9 OBESITY (BMI 30-39.9): ICD-10-CM

## 2023-07-19 DIAGNOSIS — I10 PRIMARY HYPERTENSION: ICD-10-CM

## 2023-07-19 DIAGNOSIS — D64.9 ANEMIA, UNSPECIFIED TYPE: ICD-10-CM

## 2023-07-19 DIAGNOSIS — F17.200 NICOTINE DEPENDENCE WITH CURRENT USE: ICD-10-CM

## 2023-07-19 DIAGNOSIS — E55.9 VITAMIN D DEFICIENCY: ICD-10-CM

## 2023-07-19 DIAGNOSIS — Z83.3 FAMILY HISTORY OF DIABETES MELLITUS: ICD-10-CM

## 2023-07-19 DIAGNOSIS — Z12.31 ENCOUNTER FOR SCREENING MAMMOGRAM FOR MALIGNANT NEOPLASM OF BREAST: ICD-10-CM

## 2023-07-19 DIAGNOSIS — R73.01 IMPAIRED FASTING BLOOD SUGAR: ICD-10-CM

## 2023-07-19 DIAGNOSIS — Z12.4 SCREENING FOR MALIGNANT NEOPLASM OF CERVIX: ICD-10-CM

## 2023-07-19 DIAGNOSIS — E78.5 HYPERLIPIDEMIA, UNSPECIFIED HYPERLIPIDEMIA TYPE: ICD-10-CM

## 2023-07-19 PROBLEM — R92.2 DENSE BREASTS: Status: ACTIVE | Noted: 2023-07-19

## 2023-07-19 PROBLEM — R92.30 DENSE BREASTS: Status: ACTIVE | Noted: 2023-07-19

## 2023-07-19 PROCEDURE — 87591 N.GONORRHOEAE DNA AMP PROB: CPT

## 2023-07-19 PROCEDURE — 87624 HPV HI-RISK TYP POOLED RSLT: CPT

## 2023-07-19 PROCEDURE — 3074F SYST BP LT 130 MM HG: CPT | Performed by: NURSE PRACTITIONER

## 2023-07-19 PROCEDURE — 88175 CYTOPATH C/V AUTO FLUID REDO: CPT

## 2023-07-19 PROCEDURE — 87491 CHLMYD TRACH DNA AMP PROBE: CPT

## 2023-07-19 PROCEDURE — 99396 PREV VISIT EST AGE 40-64: CPT | Performed by: NURSE PRACTITIONER

## 2023-07-19 PROCEDURE — 3079F DIAST BP 80-89 MM HG: CPT | Performed by: NURSE PRACTITIONER

## 2023-07-19 RX ORDER — ORAL SEMAGLUTIDE 3 MG/1
3 TABLET ORAL DAILY
Qty: 90 TABLET | Refills: 1 | Status: SHIPPED | OUTPATIENT
Start: 2023-07-19 | End: 2023-08-17 | Stop reason: SDUPTHER

## 2023-07-19 ASSESSMENT — FIBROSIS 4 INDEX: FIB4 SCORE: 0.56

## 2023-07-19 NOTE — ASSESSMENT & PLAN NOTE
Recent labs show fasting glucose of 96 with an A1c of 6.1%.  She continues with semaglutide 3 mg daily.

## 2023-07-19 NOTE — ASSESSMENT & PLAN NOTE
Recent labs show controlled total cholesterol, HDL and LDL.  She continues with atorvastatin 20 mg daily and fenofibrate 48 mg daily.  Triglycerides are elevated but are decreasing.

## 2023-07-19 NOTE — ASSESSMENT & PLAN NOTE
She recent labs show hemoglobin of 11.7 with decreased MCV, MCH and MCHC.  Plan to check iron, vitamin B12 and folate levels.

## 2023-07-19 NOTE — PROGRESS NOTES
Subjective:     CC:   Chief Complaint   Patient presents with    Gynecologic Exam    Lab Results       HPI:   Bernice Billings is a 42 y.o. female who presents for annual exam. She is feeling well and denies any complaints.    Vitamin D deficiency  Recent labs show controlled vitamin D level.  Continues with daily multivitamin.     Primary hypertension  Blood pressure today 118/80.  Continues losartan-hydrochlorothiazide 50-12.5 mg daily.  Recent labs show stable CMP.    Impaired fasting blood sugar  Recent labs show fasting glucose of 96 with an A1c of 6.1%.  She continues with semaglutide 3 mg daily.    Nicotine dependence with current use  Continues to smoke 2 cigarettes daily.  She is interested in quitting.  We discussed referral to tobacco cessation program she would like to hold off on today.  Discussed they do sell nicotine patches over-the-counter that she can try.    Hyperlipidemia  Recent labs show controlled total cholesterol, HDL and LDL.  She continues with atorvastatin 20 mg daily and fenofibrate 48 mg daily.  Triglycerides are elevated but are decreasing.    Anemia  She recent labs show hemoglobin of 11.7 with decreased MCV, MCH and MCHC.  Plan to check iron, vitamin B12 and folate levels.        Ob-Gyn/ History:    Patient has GYN provider: no  /Para:  4/2  Last Pap Smear:  2015. No history of abnormal pap smears.  Gyn Surgery:  pelviscopy, salpingectomy, tubal coagulation.  Current Contraceptive Method:  female sterilization. She is currently sexually active.  Last menstrual period:  2023.  Periods regular. Vary between moderate to heavy  bleeding. Cramping is moderate.   She does take OTC analgesics for cramps.  No significant bloating/fluid retention, pelvic pain, or dyspareunia. No vaginal discharge  Urinary incontinence: yes with coughing and laughing.   Folate intake: Taking daily multivitamin     Health Maintenance  PT/vit D for falls prevention: Taking daily multivitamin    Cholesterol Screening: Completed 2023   Diabetes Screening: Completed 2023  Diet: She is watching her carbohydrates, watches sugar intake. No soda or energy drinks.   Exercise: She does walking when she can.    Substance Abuse: Discussed and reviewed   Safe in relationship.   Seat belts safety discussed.  Sun protection used.    Cancer screening  Colorectal Cancer Screening: age 42    Lung Cancer Screening: age 42, pack years 2.5    Cervical Cancer Screening: due today   Breast Cancer Screenin2022, dense breasts    Infectious disease screening/Immunizations  --STI Screening: declines   --Practices safe sex.  --Hepatitis C Screenin2023   --Immunizations:    Influenza: 2015    Tetanus: 12/10/2017    Shingles: n/a    Pneumococcal : 2022, 12/10/2017     Other immunizations: Due for Hepatitis B and COVID booster     She  has a past medical history of Allergic rhinitis due to pollen (2015), Anemia (2023), Asthma, Diabetes (HCC), Hives (2015), Hyperlipidemia (2015), Hypertension (2015), and Onychomycosis (2015).    She has no past medical history of Anxiety, Arrhythmia, Arthritis, Blood transfusion without reported diagnosis, Breast cancer (HCC), Cancer (HCC), CHF (congestive heart failure) (HCC), Clotting disorder (HCC), COPD (chronic obstructive pulmonary disease) (HCC), Diabetic neuropathy (HCC), GERD (gastroesophageal reflux disease), Heart attack (HCC), Heart murmur, HIV (human immunodeficiency virus infection) (HCC), Kidney disease, Migraine, Pulmonary emphysema (HCC), Seizure (HCC), Stroke (HCC), Substance abuse (HCC), or Thyroid disease.  She  has a past surgical history that includes pelviscopy (N/A, 2018); salpingectomy (Bilateral, 2018); and tubal coagulation laparoscopic bilateral.    Family History   Problem Relation Age of Onset    Hypertension Mother     Diabetes Father     Hypertension Father     Heart Disease Father     No Known  Problems Brother     No Known Problems Brother     Ovarian Cancer Maternal Grandmother     Lung Cancer Maternal Grandfather     No Known Problems Paternal Grandmother     No Known Problems Paternal Grandfather     No Known Problems Daughter     No Known Problems Daughter        Social History     Socioeconomic History    Marital status: Legally      Spouse name: Not on file    Number of children: Not on file    Years of education: Not on file    Highest education level: Some college, no degree   Occupational History    Not on file   Tobacco Use    Smoking status: Some Days     Packs/day: 0.10     Years: 25.00     Pack years: 2.50     Types: Cigarettes    Smokeless tobacco: Never    Tobacco comments:     2 cigarettes daily, started age 12   Vaping Use    Vaping Use: Never used   Substance and Sexual Activity    Alcohol use: Yes     Comment: 1-2 beers a couple times a week    Drug use: No    Sexual activity: Yes     Partners: Male     Birth control/protection: Female Sterilization   Other Topics Concern    Not on file   Social History Narrative    Not on file     Social Determinants of Health     Financial Resource Strain: Medium Risk (5/31/2023)    Overall Financial Resource Strain (CARDIA)     Difficulty of Paying Living Expenses: Somewhat hard   Food Insecurity: No Food Insecurity (5/31/2023)    Hunger Vital Sign     Worried About Running Out of Food in the Last Year: Never true     Ran Out of Food in the Last Year: Never true   Transportation Needs: No Transportation Needs (5/31/2023)    PRAPARE - Transportation     Lack of Transportation (Medical): No     Lack of Transportation (Non-Medical): No   Physical Activity: Inactive (5/31/2023)    Exercise Vital Sign     Days of Exercise per Week: 0 days     Minutes of Exercise per Session: 0 min   Stress: Stress Concern Present (5/31/2023)    [employer] [place] [employer] - Occupational Stress Questionnaire     Feeling of Stress : To some extent    Social Connections: Socially Isolated (5/31/2023)    Social Connection and Isolation Panel [NHANES]     Frequency of Communication with Friends and Family: Once a week     Frequency of Social Gatherings with Friends and Family: Never     Attends Christian Services: Never     Active Member of Clubs or Organizations: No     Attends Club or Organization Meetings: Never     Marital Status:    Intimate Partner Violence: Not on file   Housing Stability: Low Risk  (5/31/2023)    Housing Stability Vital Sign     Unable to Pay for Housing in the Last Year: No     Number of Places Lived in the Last Year: 1     Unstable Housing in the Last Year: No       Patient Active Problem List    Diagnosis Date Noted    Dense breasts 07/19/2023    Anemia 07/19/2023    Chronic pain of right knee 09/29/2022    Impaired fasting blood sugar 01/10/2022    Situational depression 10/22/2020    Bilateral wrist pain 10/22/2020    Vitamin D deficiency 10/01/2019    Obesity (BMI 30-39.9) 10/01/2019    Nicotine dependence with current use 05/13/2019    Allergic rhinitis due to pollen 06/24/2015    Hyperlipidemia 02/11/2015    Primary hypertension 01/06/2015         Current Outpatient Medications   Medication Sig Dispense Refill    Semaglutide (RYBELSUS) 3 MG Tab Take 3 mg by mouth every day. 90 Tablet 1    atorvastatin (LIPITOR) 20 MG Tab TAKE 1 TABLET DAILY 90 Tablet 3    losartan-hydrochlorothiazide (HYZAAR) 50-12.5 MG per tablet TAKE 1 TABLET DAILY 90 Tablet 3    fenofibrate (TRICOR) 48 MG Tab TAKE 1 TABLET DAILY 90 Tablet 3    TRELEGY ELLIPTA 200-62.5-25 MCG/INH AEROSOL POWDER, BREATH ACTIVATED Inhale 200 mcg every day.      albuterol 108 (90 Base) MCG/ACT Aero Soln inhalation aerosol Inhale 2 Puffs every four hours as needed. With spacer device 18.2 g 0    Multiple Vitamin (MULTI-VITAMIN PO) Take  by mouth every day.      loratadine (CLARITIN) 10 MG Tab Take 10 mg by mouth every day.       No current facility-administered medications  "for this visit.     Allergies   Allergen Reactions    Metformin Diarrhea     Diarrhea after starting Metformin     Shellfish Allergy      Tested positive on allergy test, and has hives with SHRIMP only       Review of Systems   Constitutional: Negative for fever, chills and malaise/fatigue.   HENT: Negative for congestion.    Eyes: Negative for pain.   Respiratory: Negative for cough and shortness of breath.    Cardiovascular: Negative for leg swelling.   Gastrointestinal: Negative for nausea, vomiting, abdominal pain and diarrhea.   Genitourinary: Negative for dysuria and hematuria.   Skin: Negative for rash.   Neurological: Negative for dizziness, focal weakness and headaches.   Endo/Heme/Allergies: Does not bruise/bleed easily.   Psychiatric/Behavioral: Negative for depression.  The patient is not nervous/anxious.      Objective:     Vital signs reviewed  /80 (BP Location: Left arm, Patient Position: Sitting, BP Cuff Size: Adult)   Pulse 75   Temp 36.3 °C (97.3 °F) (Temporal)   Ht 1.575 m (5' 2\")   Wt 75.3 kg (166 lb)   LMP 06/24/2023 (Exact Date)   SpO2 98%   BMI 30.36 kg/m²   Body mass index is 30.36 kg/m².  Wt Readings from Last 4 Encounters:   07/19/23 75.3 kg (166 lb)   06/01/23 74.4 kg (164 lb)   09/29/22 72.1 kg (159 lb)   06/30/22 71.6 kg (157 lb 12.8 oz)       Physical Exam:  Constitutional: Well-developed and well-nourished. Not diaphoretic. No distress.   Skin: Skin is warm and dry. No rash noted.  Head: Atraumatic without lesions.  Eyes: Conjunctivae and extraocular motions are normal. Pupils are equal, round, and reactive to light. No scleral icterus.   Ears:  External ears unremarkable. Tympanic membranes clear and intact.  Nose: Nares patent. Septum midline. Turbinates without erythema nor edema. No discharge.   Mouth/Throat: Dentition is intact. Tongue normal. Oropharynx is clear and moist. Posterior pharynx without erythema or exudates.  Neck: Supple, trachea midline. Normal range of " motion. No thyromegaly present. No lymphadenopathy--cervical or supraclavicular.  Cardiovascular: Regular rate and rhythm, S1 and S2 without murmur, rubs, or gallops.  Lungs: Normal inspiratory effort, CTA bilaterally, no wheezes/rhonchi/rales  Abdomen: Soft, non tender, and without distention. Active bowel sounds in all four quadrants. No rebound, guarding, masses or HSM.  :Perineum and external genitalia normal without rash. Vagina with scant and bloody discharge. Cervix without visible lesions or discharge, hyperpigmentation around cervical os. Bimanual exam without adnexal masses or cervical motion tenderness.  Extremities: No cyanosis, clubbing, erythema, nor edema. Distal pulses intact and symmetric.   Musculoskeletal: All major joints AROM full in all directions without pain.  Neurological: Alert and oriented x 3. Knee DTRs 2+/3 and symmetric.  Psychiatric:  Behavior, mood, and affect are appropriate.    A chaperone was offered to the patient during today's exam. Chaperone name: Felecia Le MA was present.    Assessment and Plan:     Discussed and reviewed lab results from 7/7/2023.    1. Well woman exam with routine gynecological exam  2. Screening for malignant neoplasm of cervix  Acute uncomplicated problem.  Annual well woman exam completed today with Pap smear.  She is comfortable with her MyChart and [facility] Discussion today about general wellness and lifestyle habits:  Engage in regular physical and social activities  hearing and vision testing annually  Skin care, including sunscreen  Recommended annual eye exams and annual dental exams  Discussed wearing seatbelt when in car at all times and wearing bicycle helmet     - THINPREP PAP W/HPV AND CTNG; Future    3. Encounter for screening mammogram for malignant neoplasm of breast  Chronic stable problem.  She like to proceed with annual mammograms.  Due around September 2023.  - MA-SCREENING MAMMO BILAT  W/TOMOSYNTHESIS W/CAD; Future    4. Primary hypertension  Chronic stable problem.  Continue losartan-hydrochlorothiazide 50-12.5 mg daily.    5. Hyperlipidemia, unspecified hyperlipidemia type  Chronic stable problem.  Continue atorvastatin 20 mg daily and fenofibrate 48 mg daily.  Continue healthy diet and increase exercise.    6. Impaired fasting blood sugar  7. Obesity (BMI 30-39.9)  8. Family history of diabetes mellitus  Chronic stable problem.  Continue semaglutide 3 mg daily.  Continue healthy diet and increase exercise.  - Semaglutide (RYBELSUS) 3 MG Tab; Take 3 mg by mouth every day.  Dispense: 90 Tablet; Refill: 1    9. Anemia, unspecified type  Chronic exacerbated problem.  Past history of anemia we will check updated iron, B12 and folate levels.  - IRON/TOTAL IRON BIND; Future  - FERRITIN; Future  - VITAMIN B12; Future  - FOLATE; Future    10. Nicotine dependence with current use  Chronic stable problem.  Declines tobacco cessation program referral today.  Encouraged that there are over-the-counter patches that she can trial.    11. Vitamin D deficiency  Chronic stable problem.  Continue daily multivitamin.      HCM: Pap completed today.  Discussed that she may obtain a COVID booster at the pharmacy.  She will think about hepatitis B vaccination.  Labs per orders  Immunizations per orders  Patient counseled about skin care, diet, supplements, prenatal vitamins, safe sex and exercise.    Follow-up: Return in about 6 months (around 1/19/2024) for A1C, prediabetes .      Please note that this dictation was created using voice recognition software. I have made every reasonable attempt to correct obvious errors, but I expect that there are errors of grammar and possibly content that I did not discover before finalizing the note.

## 2023-07-19 NOTE — ASSESSMENT & PLAN NOTE
Continues to smoke 2 cigarettes daily.  She is interested in quitting.  We discussed referral to tobacco cessation program she would like to hold off on today.  Discussed they do sell nicotine patches over-the-counter that she can try.

## 2023-07-19 NOTE — ASSESSMENT & PLAN NOTE
Blood pressure today 118/80.  Continues losartan-hydrochlorothiazide 50-12.5 mg daily.  Recent labs show stable CMP.

## 2023-07-20 DIAGNOSIS — Z12.4 SCREENING FOR MALIGNANT NEOPLASM OF CERVIX: ICD-10-CM

## 2023-07-20 LAB
AMBIGUOUS DTTM AMBI4: NORMAL
C TRACH DNA GENITAL QL NAA+PROBE: NEGATIVE
CYTOLOGY REG CYTOL: NORMAL
HPV HR 12 DNA CVX QL NAA+PROBE: NEGATIVE
HPV16 DNA SPEC QL NAA+PROBE: NEGATIVE
HPV18 DNA SPEC QL NAA+PROBE: NEGATIVE
N GONORRHOEA DNA GENITAL QL NAA+PROBE: NEGATIVE
SPECIMEN SOURCE: NORMAL
SPECIMEN SOURCE: NORMAL

## 2023-07-25 ENCOUNTER — TELEPHONE (OUTPATIENT)
Dept: MEDICAL GROUP | Facility: PHYSICIAN GROUP | Age: 43
End: 2023-07-25
Payer: COMMERCIAL

## 2023-07-25 NOTE — TELEPHONE ENCOUNTER
Phone Number Called: [phone] (home) [phone] (work)      Call outcome: Left detailed message for patient. Informed to call back with any additional questions.    Message: Let pt know she has lab results to view. I left my direct number [phone] if she had questions.

## 2023-08-17 DIAGNOSIS — R73.01 IMPAIRED FASTING BLOOD SUGAR: ICD-10-CM

## 2023-08-17 DIAGNOSIS — Z83.3 FAMILY HISTORY OF DIABETES MELLITUS: ICD-10-CM

## 2023-08-17 DIAGNOSIS — E66.9 OBESITY (BMI 30-39.9): ICD-10-CM

## 2023-08-17 RX ORDER — ORAL SEMAGLUTIDE 3 MG/1
3 TABLET ORAL DAILY
Qty: 90 TABLET | Refills: 1 | Status: SHIPPED | OUTPATIENT
Start: 2023-08-17 | End: 2024-01-02 | Stop reason: SDUPTHER

## 2023-08-18 NOTE — TELEPHONE ENCOUNTER
Requested Prescriptions     Signed Prescriptions Disp Refills    Semaglutide (RYBELSUS) 3 MG Tab 90 Tablet 1     Sig: Take 3 mg by mouth every day.     Authorizing Provider: RONALDO SETHI A.P.R.N.

## 2024-01-02 ENCOUNTER — PATIENT MESSAGE (OUTPATIENT)
Dept: MEDICAL GROUP | Facility: PHYSICIAN GROUP | Age: 44
End: 2024-01-02
Payer: COMMERCIAL

## 2024-01-02 DIAGNOSIS — E66.9 OBESITY (BMI 30-39.9): ICD-10-CM

## 2024-01-02 DIAGNOSIS — R73.01 IMPAIRED FASTING BLOOD SUGAR: ICD-10-CM

## 2024-01-02 DIAGNOSIS — E78.5 HYPERLIPIDEMIA, UNSPECIFIED HYPERLIPIDEMIA TYPE: ICD-10-CM

## 2024-01-02 DIAGNOSIS — Z83.3 FAMILY HISTORY OF DIABETES MELLITUS: ICD-10-CM

## 2024-01-04 RX ORDER — ORAL SEMAGLUTIDE 3 MG/1
3 TABLET ORAL DAILY
Qty: 90 TABLET | Refills: 0 | Status: SHIPPED | OUTPATIENT
Start: 2024-01-04

## 2024-01-04 RX ORDER — ATORVASTATIN CALCIUM 20 MG/1
20 TABLET, FILM COATED ORAL DAILY
Qty: 90 TABLET | Refills: 3 | Status: SHIPPED | OUTPATIENT
Start: 2024-01-04

## 2024-01-05 NOTE — TELEPHONE ENCOUNTER
Requested Prescriptions     Signed Prescriptions Disp Refills    Semaglutide (RYBELSUS) 3 MG Tab 90 Tablet 0     Sig: Take 3 mg by mouth every day.     Authorizing Provider: RONALDO SETHI A.P.R.N.

## 2024-01-05 NOTE — PROGRESS NOTES
Requested Prescriptions     Signed Prescriptions Disp Refills    atorvastatin (LIPITOR) 20 MG Tab 90 Tablet 3     Sig: Take 1 Tablet by mouth every day.     Authorizing Provider: RONALDO SETHI A.P.R.N.

## 2024-02-28 DIAGNOSIS — I10 ESSENTIAL HYPERTENSION: ICD-10-CM

## 2024-02-28 RX ORDER — LOSARTAN POTASSIUM AND HYDROCHLOROTHIAZIDE 12.5; 5 MG/1; MG/1
TABLET ORAL
Qty: 90 TABLET | Refills: 3 | Status: SHIPPED | OUTPATIENT
Start: 2024-02-28

## 2024-02-29 ENCOUNTER — HOSPITAL ENCOUNTER (OUTPATIENT)
Dept: LAB | Facility: MEDICAL CENTER | Age: 44
End: 2024-02-29
Attending: NURSE PRACTITIONER
Payer: COMMERCIAL

## 2024-02-29 DIAGNOSIS — D64.9 ANEMIA, UNSPECIFIED TYPE: ICD-10-CM

## 2024-02-29 LAB
FERRITIN SERPL-MCNC: 10.1 NG/ML (ref 10–291)
FOLATE SERPL-MCNC: 14.1 NG/ML
IRON SATN MFR SERPL: 6 % (ref 15–55)
IRON SERPL-MCNC: 31 UG/DL (ref 40–170)
TIBC SERPL-MCNC: 488 UG/DL (ref 250–450)
UIBC SERPL-MCNC: 457 UG/DL (ref 110–370)
VIT B12 SERPL-MCNC: 850 PG/ML (ref 211–911)

## 2024-02-29 PROCEDURE — 83550 IRON BINDING TEST: CPT

## 2024-02-29 PROCEDURE — 82746 ASSAY OF FOLIC ACID SERUM: CPT

## 2024-02-29 PROCEDURE — 82728 ASSAY OF FERRITIN: CPT

## 2024-02-29 PROCEDURE — 36415 COLL VENOUS BLD VENIPUNCTURE: CPT

## 2024-02-29 PROCEDURE — 82607 VITAMIN B-12: CPT

## 2024-02-29 PROCEDURE — 83540 ASSAY OF IRON: CPT

## 2024-02-29 NOTE — TELEPHONE ENCOUNTER
Requested Prescriptions     Signed Prescriptions Disp Refills    losartan-hydrochlorothiazide (HYZAAR) 50-12.5 MG per tablet 90 Tablet 3     Sig: TAKE 1 TABLET DAILY     Authorizing Provider: RONALDO SETHI A.P.R.N.

## 2024-02-29 NOTE — TELEPHONE ENCOUNTER
Requested Prescriptions     Pending Prescriptions Disp Refills    losartan-hydrochlorothiazide (HYZAAR) 50-12.5 MG per tablet [Pharmacy Med Name: LOSARTAN/HCT TAB 50-12.5] 90 Tablet 3     Sig: TAKE 1 TABLET DAILY     Last office visit: 7/19/23  Last lab: 7/11/23

## 2024-03-06 ENCOUNTER — APPOINTMENT (OUTPATIENT)
Dept: MEDICAL GROUP | Facility: PHYSICIAN GROUP | Age: 44
End: 2024-03-06
Payer: COMMERCIAL

## 2024-03-07 ENCOUNTER — HOSPITAL ENCOUNTER (OUTPATIENT)
Dept: RADIOLOGY | Facility: MEDICAL CENTER | Age: 44
End: 2024-03-07
Attending: NURSE PRACTITIONER
Payer: COMMERCIAL

## 2024-03-07 ENCOUNTER — OFFICE VISIT (OUTPATIENT)
Dept: MEDICAL GROUP | Facility: PHYSICIAN GROUP | Age: 44
End: 2024-03-07
Payer: COMMERCIAL

## 2024-03-07 VITALS
WEIGHT: 172 LBS | HEART RATE: 91 BPM | HEIGHT: 62 IN | TEMPERATURE: 97.5 F | DIASTOLIC BLOOD PRESSURE: 80 MMHG | BODY MASS INDEX: 31.65 KG/M2 | SYSTOLIC BLOOD PRESSURE: 122 MMHG | OXYGEN SATURATION: 99 %

## 2024-03-07 DIAGNOSIS — E78.5 HYPERLIPIDEMIA, UNSPECIFIED HYPERLIPIDEMIA TYPE: ICD-10-CM

## 2024-03-07 DIAGNOSIS — R52 BODY ACHES: ICD-10-CM

## 2024-03-07 DIAGNOSIS — J02.9 SORE THROAT: ICD-10-CM

## 2024-03-07 DIAGNOSIS — M79.10 MYALGIA: ICD-10-CM

## 2024-03-07 DIAGNOSIS — J06.9 UPPER RESPIRATORY TRACT INFECTION, UNSPECIFIED TYPE: ICD-10-CM

## 2024-03-07 DIAGNOSIS — R06.02 SHORTNESS OF BREATH: ICD-10-CM

## 2024-03-07 LAB
FLUAV RNA SPEC QL NAA+PROBE: NEGATIVE
FLUBV RNA SPEC QL NAA+PROBE: NEGATIVE
RSV RNA SPEC QL NAA+PROBE: NEGATIVE
S PYO DNA SPEC NAA+PROBE: NOT DETECTED
SARS-COV-2 RNA RESP QL NAA+PROBE: NEGATIVE

## 2024-03-07 PROCEDURE — 87651 STREP A DNA AMP PROBE: CPT | Performed by: NURSE PRACTITIONER

## 2024-03-07 PROCEDURE — 0241U POCT CEPHEID COV-2, FLU A/B, RSV - PCR: CPT | Performed by: NURSE PRACTITIONER

## 2024-03-07 PROCEDURE — 71046 X-RAY EXAM CHEST 2 VIEWS: CPT

## 2024-03-07 PROCEDURE — 3074F SYST BP LT 130 MM HG: CPT | Performed by: NURSE PRACTITIONER

## 2024-03-07 PROCEDURE — 3079F DIAST BP 80-89 MM HG: CPT | Performed by: NURSE PRACTITIONER

## 2024-03-07 PROCEDURE — 99213 OFFICE O/P EST LOW 20 MIN: CPT | Performed by: NURSE PRACTITIONER

## 2024-03-07 RX ORDER — FENOFIBRATE 48 MG/1
48 TABLET, COATED ORAL DAILY
Qty: 90 TABLET | Refills: 3 | Status: SHIPPED | OUTPATIENT
Start: 2024-03-07

## 2024-03-07 ASSESSMENT — FIBROSIS 4 INDEX: FIB4 SCORE: 0.57

## 2024-03-07 ASSESSMENT — PATIENT HEALTH QUESTIONNAIRE - PHQ9: CLINICAL INTERPRETATION OF PHQ2 SCORE: 0

## 2024-03-07 NOTE — PROGRESS NOTES
Subjective:     CC: body aches, shortness of breath, sore throat, fever, runny nose, medication refill    HPI:   Bernice presents today with the following:    The body aches, shortness of breath, sore throat, subjective fever and runny nose that started 1 day ago. Using albuterol inhaler for chest tightness. Aggravating factors include none. Alleviating factors include DayQuil and cough drops. Denies chills, ear pain, sinus pain, cough, sick contacts, loss of taste, loss of smell, itchy eyes, watery eyes, chest pain.      Past Medical History:   Diagnosis Date    Allergic rhinitis due to pollen 6/24/2015    Anemia 7/19/2023    Asthma     Diabetes (HCC)     gestational only    Hives 1/6/2015    Hyperlipidemia 2/11/2015    Hypertension 1/6/2015    Onychomycosis 6/24/2015       Social History     Tobacco Use    Smoking status: Some Days     Current packs/day: 0.10     Average packs/day: 0.1 packs/day for 25.0 years (2.5 ttl pk-yrs)     Types: Cigarettes    Smokeless tobacco: Never    Tobacco comments:     2 cigarettes daily, started age 12   Vaping Use    Vaping Use: Never used   Substance Use Topics    Alcohol use: Yes     Comment: 1-2 beers a couple times a week    Drug use: No       Current Outpatient Medications Ordered in Epic   Medication Sig Dispense Refill    fenofibrate (TRICOR) 48 MG Tab Take 1 Tablet by mouth every day. 90 Tablet 3    losartan-hydrochlorothiazide (HYZAAR) 50-12.5 MG per tablet TAKE 1 TABLET DAILY 90 Tablet 3    Semaglutide (RYBELSUS) 3 MG Tab Take 3 mg by mouth every day. 90 Tablet 0    atorvastatin (LIPITOR) 20 MG Tab Take 1 Tablet by mouth every day. 90 Tablet 3    TRELEGY ELLIPTA 200-62.5-25 MCG/INH AEROSOL POWDER, BREATH ACTIVATED Inhale 200 mcg every day.      albuterol 108 (90 Base) MCG/ACT Aero Soln inhalation aerosol Inhale 2 Puffs every four hours as needed. With spacer device 18.2 g 0    Multiple Vitamin (MULTI-VITAMIN PO) Take  by mouth every day.      loratadine (CLARITIN) 10 MG  "Tab Take 10 mg by mouth every day.       No current [facility]-ordered facility-administered medications on file.       Allergies:  Metformin and Shellfish allergy    Health Maintenance: Reviewed      Objective:     Vital signs reviewed  Exam:  /80 (BP Location: Right arm, Patient Position: Sitting, BP Cuff Size: Adult)   Pulse 91   Temp 36.4 °C (97.5 °F) (Temporal)   Ht 1.575 m (5' 2\")   Wt 78 kg (172 lb)   LMP 02/20/2024   SpO2 99%   BMI 31.46 kg/m²  Body mass index is 31.46 kg/m².    General: Normal appearing. No distress.  HENT: Normocephalic. Ears normal shape and contour, right canal with cerumen, right TM partially visualized and pearly gray, left ear canal is clear and left TM pearly gray, nasal mucosa with erythema and swelling, oropharynx is with erythema, no edema or exudates. No pain on palpation to frontal or maxillary sinuses.   Eyes: Eyes conjunctiva clear lids without ptosis, pupils equal and reactive to light accommodation, lids normal.  Pulmonary: Diminished to RLL, slight expiratory wheezing in uppers.  Normal effort.  Cardiovascular: Regular rate and rhythm without murmur.   Lymph: No cervical or supraclavicular lymph nodes are palpable.  Skin: Warm and dry.  No obvious lesions.  Psych: Normal mood and affect. Alert and oriented x3. Judgment and insight is normal.    CXR:    IMPRESSION:     No radiographic evidence of acute cardiopulmonary process.    Labs:      Latest Reference Range & Units 03/07/24 10:14   POC Group A Strep, PCR Not Detected, Invalid  Not Detected        Latest Reference Range & Units 03/07/24 10:14   POC Group A Strep, PCR Not Detected, Invalid  Not Detected       Assessment & Plan:     43 y.o. female with the following -     1. Upper respiratory tract infection, unspecified type  Acute uncomplicated problem.  POCT testing negative today for strep, COVID, RSV and influenza.  Discussion that appears more consistent with a viral illness.  Recommend over-the-counter " analgesics, continue over-the-counter treatments.  Follow-up if symptoms worsen.    2. Shortness of breath  Acute uncomplicated problem.  Discussed and reviewed her chest x-ray results.  See #1 above.  - DX-CHEST-2 VIEWS; Future  - POCT CoV-2, Flu A/B, RSV by PCR    3. Myalgia  4. Body aches  Acute uncomplicated problem.  POCT negative today for influenza, COVID and RSV.  See #1 above.  - POCT CoV-2, Flu A/B, RSV by PCR    5. Sore throat  Acute uncomplicated problem.  POCT strep negative today.  See #1 above.  - POCT CoV-2, Flu A/B, RSV by PCR  - POCT CEPHEID GROUP A STREP - PCR    6. Hyperlipidemia, unspecified hyperlipidemia type  Chronic stable problem.  Continue fenofibrate 48 mg daily.   - fenofibrate (TRICOR) 48 MG Tab; Take 1 Tablet by mouth every day.  Dispense: 90 Tablet; Refill: 3      Return if symptoms worsen or fail to improve.    Please note that this dictation was created using voice recognition software. I have made every reasonable attempt to correct obvious errors, but I expect that there are errors of grammar and possibly content that I did not discover before finalizing the note.

## 2024-05-16 ENCOUNTER — APPOINTMENT (OUTPATIENT)
Dept: MEDICAL GROUP | Facility: PHYSICIAN GROUP | Age: 44
End: 2024-05-16
Payer: COMMERCIAL

## 2024-05-16 VITALS
DIASTOLIC BLOOD PRESSURE: 66 MMHG | HEART RATE: 99 BPM | HEIGHT: 61 IN | OXYGEN SATURATION: 97 % | SYSTOLIC BLOOD PRESSURE: 124 MMHG | TEMPERATURE: 97.2 F | BODY MASS INDEX: 31.72 KG/M2 | WEIGHT: 168 LBS

## 2024-05-16 DIAGNOSIS — R73.03 PREDIABETES: ICD-10-CM

## 2024-05-16 DIAGNOSIS — D50.9 IRON DEFICIENCY ANEMIA, UNSPECIFIED IRON DEFICIENCY ANEMIA TYPE: ICD-10-CM

## 2024-05-16 DIAGNOSIS — I10 PRIMARY HYPERTENSION: ICD-10-CM

## 2024-05-16 DIAGNOSIS — E66.9 OBESITY (BMI 30-39.9): ICD-10-CM

## 2024-05-16 DIAGNOSIS — E55.9 VITAMIN D DEFICIENCY: ICD-10-CM

## 2024-05-16 DIAGNOSIS — E78.5 HYPERLIPIDEMIA, UNSPECIFIED HYPERLIPIDEMIA TYPE: ICD-10-CM

## 2024-05-16 DIAGNOSIS — Z12.31 ENCOUNTER FOR SCREENING MAMMOGRAM FOR BREAST CANCER: ICD-10-CM

## 2024-05-16 PROBLEM — Z83.3 FAMILY HISTORY OF DIABETES MELLITUS: Status: ACTIVE | Noted: 2024-05-16

## 2024-05-16 LAB
HBA1C MFR BLD: 5.6 % (ref ?–5.8)
POCT INT CON NEG: NEGATIVE
POCT INT CON POS: POSITIVE

## 2024-05-16 PROCEDURE — 83036 HEMOGLOBIN GLYCOSYLATED A1C: CPT | Performed by: NURSE PRACTITIONER

## 2024-05-16 PROCEDURE — 99214 OFFICE O/P EST MOD 30 MIN: CPT | Performed by: NURSE PRACTITIONER

## 2024-05-16 PROCEDURE — 3078F DIAST BP <80 MM HG: CPT | Performed by: NURSE PRACTITIONER

## 2024-05-16 PROCEDURE — 3074F SYST BP LT 130 MM HG: CPT | Performed by: NURSE PRACTITIONER

## 2024-05-16 RX ORDER — FERROUS SULFATE 325(65) MG
325 TABLET ORAL
Qty: 45 TABLET | Refills: 1 | Status: SHIPPED | OUTPATIENT
Start: 2024-05-16

## 2024-05-16 RX ORDER — ORAL SEMAGLUTIDE 3 MG/1
3 TABLET ORAL DAILY
Qty: 90 TABLET | Refills: 3 | Status: SHIPPED | OUTPATIENT
Start: 2024-05-16

## 2024-05-16 ASSESSMENT — FIBROSIS 4 INDEX: FIB4 SCORE: 0.57

## 2024-05-16 NOTE — ASSESSMENT & PLAN NOTE
July 2023 labs showed anemia.  Patient completed labs for iron, vitamin B12 and folic acid February 2024.  Reviewing labs today.  Discussed she does have iron deficiency.  Starting ferrous sulfate 325 mg every other day dosing.  Plan to repeat labs in 3 months.

## 2024-05-16 NOTE — PROGRESS NOTES
Subjective:     CC: lab results    HPI:   Bernice presents today with the following:      Iron deficiency anemia  July 2023 labs showed anemia.  Patient completed labs for iron, vitamin B12 and folic acid February 2024.  Reviewing labs today.  Discussed she does have iron deficiency.  Starting ferrous sulfate 325 mg every other day dosing.  Plan to repeat labs in 3 months.    Prediabetes  Previous A1c 6.1%.  A1c today 5.6%. She does watch her sugars and is walking more. She does watch portion control. Continues Rybelsus 3 mg daily.      Past Medical History:   Diagnosis Date    Allergic rhinitis due to pollen 6/24/2015    Anemia 7/19/2023    Asthma     Diabetes (HCC)     gestational only    Hives 1/6/2015    Hyperlipidemia 2/11/2015    Hypertension 1/6/2015    Onychomycosis 6/24/2015       Social History     Tobacco Use    Smoking status: Some Days     Current packs/day: 0.10     Average packs/day: 0.1 packs/day for 25.0 years (2.5 ttl pk-yrs)     Types: Cigarettes    Smokeless tobacco: Never    Tobacco comments:     2 cigarettes daily, started age 12   Vaping Use    Vaping status: Never Used   Substance Use Topics    Alcohol use: Yes     Comment: 1-2 beers a couple times a week    Drug use: No       Current Outpatient Medications Ordered in Epic   Medication Sig Dispense Refill    Semaglutide (RYBELSUS) 3 MG Tab Take 3 mg by mouth every day. 90 Tablet 3    ferrous sulfate 325 (65 Fe) MG tablet Take 1 Tablet by mouth every 48 hours. 45 Tablet 1    fenofibrate (TRICOR) 48 MG Tab Take 1 Tablet by mouth every day. 90 Tablet 3    losartan-hydrochlorothiazide (HYZAAR) 50-12.5 MG per tablet TAKE 1 TABLET DAILY 90 Tablet 3    atorvastatin (LIPITOR) 20 MG Tab Take 1 Tablet by mouth every day. 90 Tablet 3    TRELEGY ELLIPTA 200-62.5-25 MCG/INH AEROSOL POWDER, BREATH ACTIVATED Inhale 200 mcg every day.      albuterol 108 (90 Base) MCG/ACT Aero Soln inhalation aerosol Inhale 2 Puffs every four hours as needed. With spacer  "device 18.2 g 0    Multiple Vitamin (MULTI-VITAMIN PO) Take  by mouth every day.      loratadine (CLARITIN) 10 MG Tab Take 10 mg by mouth every day.       No current [facility]-ordered facility-administered medications on file.       Allergies:  Metformin and Shellfish allergy    Health Maintenance: Reviewed. Declines HIV screen.  Discussed colon cancer screening and she would like to think about at this time.      Objective:     Vital signs reviewed  Exam:  /66 (BP Location: Right arm, Patient Position: Sitting, BP Cuff Size: Adult)   Pulse 99   Temp 36.2 °C (97.2 °F) (Temporal)   Ht 1.56 m (5' 1.42\")   Wt 76.2 kg (168 lb)   LMP 05/11/2024   SpO2 97%   BMI 31.31 kg/m²  Body mass index is 31.31 kg/m².    Gen: Alert and oriented, No apparent distress.  Neck: Neck is supple, full ROM.  Lungs: Normal effort, no audible wheezes  CV: Skin pink, warm and dry.  Ext: No clubbing, cyanosis, edema.      Labs:        Latest Reference Range & Units 05/16/24 14:06   Glycohemoglobin 5.8 % 5.6       Assessment & Plan:     43 y.o. female with the following -     Discussed and reviewed lab results from 2/29/2024.    1. Iron deficiency anemia, unspecified iron deficiency anemia type  Acute uncomplicated problem.  New problem to examiner.  Start ferrous sulfate 325 mg every other day dosing.  Plan for repeat labs in 3 months.  - ferrous sulfate 325 (65 Fe) MG tablet; Take 1 Tablet by mouth every 48 hours.  Dispense: 45 Tablet; Refill: 1  - CBC WITHOUT DIFFERENTIAL; Future  - IRON/TOTAL IRON BIND; Future  - FERRITIN; Future    2. Prediabetes  Chronic stable problem.  A1c is stable at 5.6%.  Continue with Rybelsus 3 mg daily.  Continue healthy diet and exercise.  - Semaglutide (RYBELSUS) 3 MG Tab; Take 3 mg by mouth every day.  Dispense: 90 Tablet; Refill: 3  - POCT  A1C    3. Obesity (BMI 30-39.9)  Chronic stable problem.  Continue healthy diet and exercise.  Continue Rybelsus 3 mg daily.  - Semaglutide (RYBELSUS) 3 MG Tab; Take " 3 mg by mouth every day.  Dispense: 90 Tablet; Refill: 3    4. Hyperlipidemia, unspecified hyperlipidemia type  Chronic stable problem.  Continue atorvastatin 20 mg daily and fenofibrate 48 mg daily.  Due for annual labs around August 2024.  - Lipid Profile; Future  - Comp Metabolic Panel; Future    5. Primary hypertension  Chronic stable problem.  Blood pressure 124/66 today.  Continue losartan-hydrochlorothiazide 50-12.5 mg daily.  Due for annual labs around August 2024.  - Comp Metabolic Panel; Future  - TSH WITH REFLEX TO FT4; Future    6. Vitamin D deficiency  Chronic stable problem.  Continue daily multivitamin.  Due for annual labs around August 2024.  - VITAMIN D,25 HYDROXY (DEFICIENCY); Future    7. Encounter for screening mammogram for breast cancer  Acute uncomplicated problem.  Due for mammogram.  - MA-SCREENING MAMMO BILAT W/TOMOSYNTHESIS W/CAD; Future      Return in about 3 months (around 8/16/2024) for annual, Labs.    Please note that this dictation was created using voice recognition software. I have made every reasonable attempt to correct obvious errors, but I expect that there are errors of grammar and possibly content that I did not discover before finalizing the note.

## 2024-05-16 NOTE — ASSESSMENT & PLAN NOTE
PATIENT HAS STOMACH BUG   Previous A1c 6.1%.  A1c today 5.6%. She does watch her sugars and is walking more. She does watch portion control. Continues Rybelsus 3 mg daily.

## 2024-06-10 DIAGNOSIS — E78.5 HYPERLIPIDEMIA, UNSPECIFIED HYPERLIPIDEMIA TYPE: ICD-10-CM

## 2024-06-11 RX ORDER — ATORVASTATIN CALCIUM 20 MG/1
20 TABLET, FILM COATED ORAL DAILY
Qty: 90 TABLET | Refills: 0 | Status: SHIPPED | OUTPATIENT
Start: 2024-06-11

## 2024-06-11 NOTE — TELEPHONE ENCOUNTER
Requested Prescriptions     Signed Prescriptions Disp Refills    atorvastatin (LIPITOR) 20 MG Tab 90 Tablet 0     Sig: Take 1 Tablet by mouth every day.     Authorizing Provider: RONALDO SETHI A.P.R.N.

## 2024-06-11 NOTE — TELEPHONE ENCOUNTER
Received request via: Patient    Was the patient seen in the last year in this department? Yes    Does the patient have an active prescription (recently filled or refills available) for medication(s) requested? Yes new pharmacy     [facility]    Does the patient have longterm Plus and need 100 day supply (blood pressure, diabetes and cholesterol meds only)? Patient does not have SCP

## 2024-07-01 ENCOUNTER — APPOINTMENT (OUTPATIENT)
Dept: URGENT CARE | Facility: PHYSICIAN GROUP | Age: 44
End: 2024-07-01
Payer: COMMERCIAL

## 2024-07-03 ENCOUNTER — OFFICE VISIT (OUTPATIENT)
Dept: URGENT CARE | Facility: PHYSICIAN GROUP | Age: 44
End: 2024-07-03
Payer: COMMERCIAL

## 2024-07-03 ENCOUNTER — APPOINTMENT (OUTPATIENT)
Dept: URGENT CARE | Facility: PHYSICIAN GROUP | Age: 44
End: 2024-07-03
Payer: COMMERCIAL

## 2024-07-03 VITALS
HEIGHT: 62 IN | SYSTOLIC BLOOD PRESSURE: 124 MMHG | TEMPERATURE: 97.1 F | RESPIRATION RATE: 18 BRPM | OXYGEN SATURATION: 96 % | BODY MASS INDEX: 30.73 KG/M2 | WEIGHT: 167 LBS | DIASTOLIC BLOOD PRESSURE: 84 MMHG | HEART RATE: 86 BPM

## 2024-07-03 DIAGNOSIS — K52.9 GASTROENTERITIS: ICD-10-CM

## 2024-07-03 DIAGNOSIS — R11.0 NAUSEA: ICD-10-CM

## 2024-07-03 DIAGNOSIS — R19.7 DIARRHEA, UNSPECIFIED TYPE: ICD-10-CM

## 2024-07-03 DIAGNOSIS — R10.13 EPIGASTRIC ABDOMINAL PAIN: ICD-10-CM

## 2024-07-03 LAB
APPEARANCE UR: CLEAR
BILIRUB UR STRIP-MCNC: NEGATIVE MG/DL
COLOR UR AUTO: YELLOW
GLUCOSE UR STRIP.AUTO-MCNC: NEGATIVE MG/DL
KETONES UR STRIP.AUTO-MCNC: NEGATIVE MG/DL
LEUKOCYTE ESTERASE UR QL STRIP.AUTO: NORMAL
NITRITE UR QL STRIP.AUTO: NEGATIVE
PH UR STRIP.AUTO: 6 [PH] (ref 5–8)
PROT UR QL STRIP: NEGATIVE MG/DL
RBC UR QL AUTO: NORMAL
SP GR UR STRIP.AUTO: 1.01
UROBILINOGEN UR STRIP-MCNC: 0.2 MG/DL

## 2024-07-03 PROCEDURE — 3074F SYST BP LT 130 MM HG: CPT | Performed by: PHYSICIAN ASSISTANT

## 2024-07-03 PROCEDURE — 81002 URINALYSIS NONAUTO W/O SCOPE: CPT | Performed by: PHYSICIAN ASSISTANT

## 2024-07-03 PROCEDURE — 3079F DIAST BP 80-89 MM HG: CPT | Performed by: PHYSICIAN ASSISTANT

## 2024-07-03 PROCEDURE — 99213 OFFICE O/P EST LOW 20 MIN: CPT | Performed by: PHYSICIAN ASSISTANT

## 2024-07-03 RX ORDER — FAMOTIDINE 20 MG/1
20 TABLET, FILM COATED ORAL 2 TIMES DAILY
Qty: 28 TABLET | Refills: 0 | Status: SHIPPED | OUTPATIENT
Start: 2024-07-03 | End: 2024-07-17

## 2024-07-03 RX ORDER — ONDANSETRON 4 MG/1
4 TABLET, ORALLY DISINTEGRATING ORAL EVERY 6 HOURS PRN
Qty: 15 TABLET | Refills: 0 | Status: SHIPPED | OUTPATIENT
Start: 2024-07-03

## 2024-07-03 ASSESSMENT — FIBROSIS 4 INDEX: FIB4 SCORE: 0.57

## 2024-07-21 DIAGNOSIS — R10.13 EPIGASTRIC ABDOMINAL PAIN: ICD-10-CM

## 2024-08-26 DIAGNOSIS — E78.5 HYPERLIPIDEMIA, UNSPECIFIED HYPERLIPIDEMIA TYPE: ICD-10-CM

## 2024-08-26 NOTE — TELEPHONE ENCOUNTER
Received request via: Patient    Was the patient seen in the last year in this department? Yes    Does the patient have an active prescription (recently filled or refills available) for medication(s) requested? No    Ph[facility] Name: [facility]    Does the patient have halfway Plus and need 100-day supply? (This applies to ALL medications) Patient does not have SCP

## 2024-08-27 ENCOUNTER — APPOINTMENT (OUTPATIENT)
Dept: MEDICAL GROUP | Facility: PHYSICIAN GROUP | Age: 44
End: 2024-08-27
Payer: COMMERCIAL

## 2024-08-27 RX ORDER — FENOFIBRATE 48 MG/1
48 TABLET, COATED ORAL DAILY
Qty: 90 TABLET | Refills: 0 | Status: SHIPPED | OUTPATIENT
Start: 2024-08-27

## 2024-08-27 NOTE — TELEPHONE ENCOUNTER
Requested Prescriptions     Signed Prescriptions Disp Refills    fenofibrate (TRICOR) 48 MG Tab 90 Tablet 0     Sig: Take 1 Tablet by mouth every day.     Authorizing Provider: RONALDO SETHI A.P.R.N.

## 2024-08-29 ENCOUNTER — APPOINTMENT (OUTPATIENT)
Dept: MEDICAL GROUP | Facility: PHYSICIAN GROUP | Age: 44
End: 2024-08-29
Payer: COMMERCIAL

## 2024-09-10 DIAGNOSIS — E66.9 OBESITY (BMI 30-39.9): ICD-10-CM

## 2024-09-10 DIAGNOSIS — R73.03 PREDIABETES: ICD-10-CM

## 2024-09-10 DIAGNOSIS — E78.5 HYPERLIPIDEMIA, UNSPECIFIED HYPERLIPIDEMIA TYPE: ICD-10-CM

## 2024-09-11 ENCOUNTER — APPOINTMENT (OUTPATIENT)
Dept: MEDICAL GROUP | Facility: PHYSICIAN GROUP | Age: 44
End: 2024-09-11
Payer: COMMERCIAL

## 2024-09-11 RX ORDER — ORAL SEMAGLUTIDE 3 MG/1
3 TABLET ORAL DAILY
Qty: 90 TABLET | Refills: 3 | OUTPATIENT
Start: 2024-09-11

## 2024-09-11 RX ORDER — ATORVASTATIN CALCIUM 20 MG/1
20 TABLET, FILM COATED ORAL DAILY
Qty: 90 TABLET | Refills: 3 | Status: SHIPPED | OUTPATIENT
Start: 2024-09-11

## 2024-09-11 RX ORDER — ORAL SEMAGLUTIDE 3 MG/1
3 TABLET ORAL DAILY
Qty: 90 TABLET | Refills: 3 | Status: SHIPPED | OUTPATIENT
Start: 2024-09-11 | End: 2024-09-18 | Stop reason: SDUPTHER

## 2024-09-11 RX ORDER — FENOFIBRATE 48 MG/1
48 TABLET, COATED ORAL DAILY
Qty: 90 TABLET | Refills: 3 | Status: SHIPPED | OUTPATIENT
Start: 2024-09-11

## 2024-09-11 NOTE — TELEPHONE ENCOUNTER
Received request via: Pharmacy    Was the patient seen in the last year in this department? Yes    Does the patient have an active prescription (recently filled or refills available) for medication(s) requested? No    [facility]    Does the patient have MCFP Plus and need 100-day supply? (This applies to ALL medications) Patient does not have SCP

## 2024-09-11 NOTE — TELEPHONE ENCOUNTER
Received request via: Patient    Was the patient seen in the last year in this department? Yes    Does the patient have an active prescription (recently filled or refills available) for medication(s) requested? Yes new pharmacy     Pharmacy Name: [facility]    Does the patient have assisted Plus and need 100-day supply? (This applies to ALL medications) Patient does not have SCP

## 2024-09-12 NOTE — TELEPHONE ENCOUNTER
Requested Prescriptions     Signed Prescriptions Disp Refills    fenofibrate (TRICOR) 48 MG Tab 90 Tablet 3     Sig: Take 1 Tablet by mouth every day.     Authorizing Provider: RONALDO SETHI    Semaglutide (RYBELSUS) 3 MG Tab 90 Tablet 3     Sig: Take 3 mg by mouth every day.     Authorizing Provider: RONALDO SETHI A.P.R.N.

## 2024-09-18 RX ORDER — ORAL SEMAGLUTIDE 3 MG/1
3 TABLET ORAL DAILY
Qty: 90 TABLET | Refills: 3 | Status: SHIPPED | OUTPATIENT
Start: 2024-09-18

## 2024-09-18 NOTE — TELEPHONE ENCOUNTER
Received request via: Patient    Was the patient seen in the last year in this department? Yes    Does the patient have an active prescription (recently filled or refills available) for medication(s) requested? Yes new pharmacy     [facility]    Does the patient have longterm Plus and need 100-day supply? (This applies to ALL medications) Patient does not have SCP

## 2024-09-18 NOTE — TELEPHONE ENCOUNTER
Requested Prescriptions     Signed Prescriptions Disp Refills    Semaglutide (RYBELSUS) 3 MG Tab 90 Tablet 3     Sig: Take 3 mg by mouth every day.     Authorizing Provider: RONALDO SETHI     Refused Prescriptions Disp Refills    Semaglutide (RYBELSUS) 3 MG Tab 90 Tablet 3     Sig: Take 3 mg by mouth every day.     Refused By: LATRELL SEGUNDO     Reason for Refusal: Request already responded to by other means (e.g. phone or fax)     MAYTE Bal.

## 2024-09-19 ENCOUNTER — APPOINTMENT (OUTPATIENT)
Dept: RADIOLOGY | Facility: MEDICAL CENTER | Age: 44
End: 2024-09-19
Attending: NURSE PRACTITIONER
Payer: COMMERCIAL

## 2024-09-25 ENCOUNTER — HOSPITAL ENCOUNTER (OUTPATIENT)
Dept: RADIOLOGY | Facility: MEDICAL CENTER | Age: 44
End: 2024-09-25
Attending: NURSE PRACTITIONER
Payer: COMMERCIAL

## 2024-09-25 DIAGNOSIS — R92.8 ABNORMALITY OF LEFT BREAST ON SCREENING MAMMOGRAM: ICD-10-CM

## 2024-09-25 DIAGNOSIS — Z12.31 ENCOUNTER FOR SCREENING MAMMOGRAM FOR BREAST CANCER: ICD-10-CM

## 2024-09-25 PROCEDURE — 77067 SCR MAMMO BI INCL CAD: CPT

## 2024-09-26 ENCOUNTER — HOSPITAL ENCOUNTER (OUTPATIENT)
Dept: LAB | Facility: MEDICAL CENTER | Age: 44
End: 2024-09-26
Attending: NURSE PRACTITIONER
Payer: COMMERCIAL

## 2024-09-26 DIAGNOSIS — E55.9 VITAMIN D DEFICIENCY: ICD-10-CM

## 2024-09-26 DIAGNOSIS — I10 PRIMARY HYPERTENSION: ICD-10-CM

## 2024-09-26 DIAGNOSIS — E78.5 HYPERLIPIDEMIA, UNSPECIFIED HYPERLIPIDEMIA TYPE: ICD-10-CM

## 2024-09-26 DIAGNOSIS — D50.9 IRON DEFICIENCY ANEMIA, UNSPECIFIED IRON DEFICIENCY ANEMIA TYPE: ICD-10-CM

## 2024-09-26 LAB
25(OH)D3 SERPL-MCNC: 25 NG/ML (ref 30–100)
ALBUMIN SERPL BCP-MCNC: 4.2 G/DL (ref 3.2–4.9)
ALBUMIN/GLOB SERPL: 1.2 G/DL
ALP SERPL-CCNC: 71 U/L (ref 30–99)
ALT SERPL-CCNC: 13 U/L (ref 2–50)
ANION GAP SERPL CALC-SCNC: 15 MMOL/L (ref 7–16)
AST SERPL-CCNC: 18 U/L (ref 12–45)
BILIRUB SERPL-MCNC: 0.4 MG/DL (ref 0.1–1.5)
BUN SERPL-MCNC: 8 MG/DL (ref 8–22)
CALCIUM ALBUM COR SERPL-MCNC: 9.1 MG/DL (ref 8.5–10.5)
CALCIUM SERPL-MCNC: 9.3 MG/DL (ref 8.5–10.5)
CHLORIDE SERPL-SCNC: 99 MMOL/L (ref 96–112)
CHOLEST SERPL-MCNC: 189 MG/DL (ref 100–199)
CO2 SERPL-SCNC: 22 MMOL/L (ref 20–33)
CREAT SERPL-MCNC: 0.44 MG/DL (ref 0.5–1.4)
ERYTHROCYTE [DISTWIDTH] IN BLOOD BY AUTOMATED COUNT: 46.3 FL (ref 35.9–50)
FASTING STATUS PATIENT QL REPORTED: NORMAL
FERRITIN SERPL-MCNC: 7.7 NG/ML (ref 10–291)
GFR SERPLBLD CREATININE-BSD FMLA CKD-EPI: 121 ML/MIN/1.73 M 2
GLOBULIN SER CALC-MCNC: 3.4 G/DL (ref 1.9–3.5)
GLUCOSE SERPL-MCNC: 107 MG/DL (ref 65–99)
HCT VFR BLD AUTO: 36.6 % (ref 37–47)
HDLC SERPL-MCNC: 46 MG/DL
HGB BLD-MCNC: 11.2 G/DL (ref 12–16)
IRON SATN MFR SERPL: 7 % (ref 15–55)
IRON SERPL-MCNC: 34 UG/DL (ref 40–170)
LDLC SERPL CALC-MCNC: 106 MG/DL
MCH RBC QN AUTO: 24.4 PG (ref 27–33)
MCHC RBC AUTO-ENTMCNC: 30.6 G/DL (ref 32.2–35.5)
MCV RBC AUTO: 79.7 FL (ref 81.4–97.8)
PLATELET # BLD AUTO: 395 K/UL (ref 164–446)
PMV BLD AUTO: 12.2 FL (ref 9–12.9)
POTASSIUM SERPL-SCNC: 4.3 MMOL/L (ref 3.6–5.5)
PROT SERPL-MCNC: 7.6 G/DL (ref 6–8.2)
RBC # BLD AUTO: 4.59 M/UL (ref 4.2–5.4)
SODIUM SERPL-SCNC: 136 MMOL/L (ref 135–145)
TIBC SERPL-MCNC: 477 UG/DL (ref 250–450)
TRIGL SERPL-MCNC: 187 MG/DL (ref 0–149)
TSH SERPL DL<=0.005 MIU/L-ACNC: 1.16 UIU/ML (ref 0.38–5.33)
UIBC SERPL-MCNC: 443 UG/DL (ref 110–370)
WBC # BLD AUTO: 8.7 K/UL (ref 4.8–10.8)

## 2024-09-26 PROCEDURE — 83540 ASSAY OF IRON: CPT

## 2024-09-26 PROCEDURE — 82728 ASSAY OF FERRITIN: CPT

## 2024-09-26 PROCEDURE — 80061 LIPID PANEL: CPT

## 2024-09-26 PROCEDURE — 84443 ASSAY THYROID STIM HORMONE: CPT

## 2024-09-26 PROCEDURE — 83550 IRON BINDING TEST: CPT

## 2024-09-26 PROCEDURE — 82306 VITAMIN D 25 HYDROXY: CPT

## 2024-09-26 PROCEDURE — 85027 COMPLETE CBC AUTOMATED: CPT

## 2024-09-26 PROCEDURE — 80053 COMPREHEN METABOLIC PANEL: CPT

## 2024-09-26 PROCEDURE — 36415 COLL VENOUS BLD VENIPUNCTURE: CPT

## 2024-09-26 NOTE — PROGRESS NOTES
Left breast asymmetry seen on screening mammogram.  Recommendations for further imaging.  Diagnostic mammogram and left breast ultrasound ordered.

## 2024-10-03 ENCOUNTER — APPOINTMENT (OUTPATIENT)
Dept: MEDICAL GROUP | Facility: PHYSICIAN GROUP | Age: 44
End: 2024-10-03
Payer: COMMERCIAL

## 2024-10-03 VITALS
WEIGHT: 162 LBS | BODY MASS INDEX: 29.81 KG/M2 | HEIGHT: 62 IN | HEART RATE: 84 BPM | OXYGEN SATURATION: 99 % | SYSTOLIC BLOOD PRESSURE: 136 MMHG | DIASTOLIC BLOOD PRESSURE: 80 MMHG | TEMPERATURE: 97.1 F

## 2024-10-03 DIAGNOSIS — Z00.00 ANNUAL VISIT FOR GENERAL ADULT MEDICAL EXAMINATION WITHOUT ABNORMAL FINDINGS: ICD-10-CM

## 2024-10-03 DIAGNOSIS — D50.9 IRON DEFICIENCY ANEMIA, UNSPECIFIED IRON DEFICIENCY ANEMIA TYPE: ICD-10-CM

## 2024-10-03 DIAGNOSIS — E78.5 HYPERLIPIDEMIA, UNSPECIFIED HYPERLIPIDEMIA TYPE: ICD-10-CM

## 2024-10-03 DIAGNOSIS — Z12.12 SCREENING FOR COLORECTAL CANCER: ICD-10-CM

## 2024-10-03 DIAGNOSIS — Z12.11 SCREENING FOR COLORECTAL CANCER: ICD-10-CM

## 2024-10-03 DIAGNOSIS — I10 PRIMARY HYPERTENSION: ICD-10-CM

## 2024-10-03 DIAGNOSIS — E55.9 VITAMIN D DEFICIENCY: ICD-10-CM

## 2024-10-03 DIAGNOSIS — R73.03 PREDIABETES: ICD-10-CM

## 2024-10-03 PROCEDURE — 3079F DIAST BP 80-89 MM HG: CPT | Performed by: NURSE PRACTITIONER

## 2024-10-03 PROCEDURE — 99396 PREV VISIT EST AGE 40-64: CPT | Performed by: NURSE PRACTITIONER

## 2024-10-03 PROCEDURE — 3075F SYST BP GE 130 - 139MM HG: CPT | Performed by: NURSE PRACTITIONER

## 2024-10-03 RX ORDER — FERROUS SULFATE 325(65) MG
325 TABLET ORAL
Qty: 45 TABLET | Refills: 1 | Status: SHIPPED | OUTPATIENT
Start: 2024-10-03

## 2024-10-03 SDOH — HEALTH STABILITY: PHYSICAL HEALTH: ON AVERAGE, HOW MANY DAYS PER WEEK DO YOU ENGAGE IN MODERATE TO STRENUOUS EXERCISE (LIKE A BRISK WALK)?: 0 DAYS

## 2024-10-03 SDOH — ECONOMIC STABILITY: INCOME INSECURITY: HOW HARD IS IT FOR YOU TO PAY FOR THE VERY BASICS LIKE FOOD, HOUSING, MEDICAL CARE, AND HEATING?: SOMEWHAT HARD

## 2024-10-03 SDOH — HEALTH STABILITY: PHYSICAL HEALTH: ON AVERAGE, HOW MANY MINUTES DO YOU ENGAGE IN EXERCISE AT THIS LEVEL?: 0 MIN

## 2024-10-03 SDOH — ECONOMIC STABILITY: FOOD INSECURITY: WITHIN THE PAST 12 MONTHS, THE FOOD YOU BOUGHT JUST DIDN'T LAST AND YOU DIDN'T HAVE MONEY TO GET MORE.: NEVER TRUE

## 2024-10-03 SDOH — ECONOMIC STABILITY: FOOD INSECURITY: WITHIN THE PAST 12 MONTHS, YOU WORRIED THAT YOUR FOOD WOULD RUN OUT BEFORE YOU GOT MONEY TO BUY MORE.: SOMETIMES TRUE

## 2024-10-03 SDOH — HEALTH STABILITY: MENTAL HEALTH
STRESS IS WHEN SOMEONE FEELS TENSE, NERVOUS, ANXIOUS, OR CAN'T SLEEP AT NIGHT BECAUSE THEIR MIND IS TROUBLED. HOW STRESSED ARE YOU?: ONLY A LITTLE

## 2024-10-03 SDOH — ECONOMIC STABILITY: INCOME INSECURITY: IN THE LAST 12 MONTHS, WAS THERE A TIME WHEN YOU WERE NOT ABLE TO PAY THE MORTGAGE OR RENT ON TIME?: NO

## 2024-10-03 ASSESSMENT — SOCIAL DETERMINANTS OF HEALTH (SDOH)
DO YOU BELONG TO ANY CLUBS OR ORGANIZATIONS SUCH AS CHURCH GROUPS UNIONS, FRATERNAL OR ATHLETIC GROUPS, OR SCHOOL GROUPS?: NO
HOW HARD IS IT FOR YOU TO PAY FOR THE VERY BASICS LIKE FOOD, HOUSING, MEDICAL CARE, AND HEATING?: SOMEWHAT HARD
IN THE PAST 12 MONTHS, HAS THE ELECTRIC, GAS, OIL, OR WATER COMPANY THREATENED TO SHUT OFF SERVICE IN YOUR HOME?: NO
HOW OFTEN DO YOU GET TOGETHER WITH FRIENDS OR RELATIVES?: ONCE A WEEK
IN A TYPICAL WEEK, HOW MANY TIMES DO YOU TALK ON THE PHONE WITH FAMILY, FRIENDS, OR NEIGHBORS?: ONCE A WEEK
WITHIN THE PAST 12 MONTHS, YOU WORRIED THAT YOUR FOOD WOULD RUN OUT BEFORE YOU GOT THE MONEY TO BUY MORE: SOMETIMES TRUE
IN A TYPICAL WEEK, HOW MANY TIMES DO YOU TALK ON THE PHONE WITH FAMILY, FRIENDS, OR NEIGHBORS?: ONCE A WEEK
HOW OFTEN DO YOU ATTEND CHURCH OR RELIGIOUS SERVICES?: NEVER
HOW MANY DRINKS CONTAINING ALCOHOL DO YOU HAVE ON A TYPICAL DAY WHEN YOU ARE DRINKING: 1 OR 2
HOW OFTEN DO YOU ATTENT MEETINGS OF THE CLUB OR ORGANIZATION YOU BELONG TO?: NEVER
HOW OFTEN DO YOU GET TOGETHER WITH FRIENDS OR RELATIVES?: ONCE A WEEK
HOW OFTEN DO YOU HAVE SIX OR MORE DRINKS ON ONE OCCASION: LESS THAN MONTHLY
HOW OFTEN DO YOU HAVE A DRINK CONTAINING ALCOHOL: 2-3 TIMES A WEEK
DO YOU BELONG TO ANY CLUBS OR ORGANIZATIONS SUCH AS CHURCH GROUPS UNIONS, FRATERNAL OR ATHLETIC GROUPS, OR SCHOOL GROUPS?: NO
HOW OFTEN DO YOU ATTEND CHURCH OR RELIGIOUS SERVICES?: NEVER
HOW OFTEN DO YOU ATTENT MEETINGS OF THE CLUB OR ORGANIZATION YOU BELONG TO?: NEVER

## 2024-10-03 ASSESSMENT — LIFESTYLE VARIABLES
SKIP TO QUESTIONS 9-10: 0
HOW MANY STANDARD DRINKS CONTAINING ALCOHOL DO YOU HAVE ON A TYPICAL DAY: 1 OR 2
HOW OFTEN DO YOU HAVE A DRINK CONTAINING ALCOHOL: 2-3 TIMES A WEEK
HOW OFTEN DO YOU HAVE SIX OR MORE DRINKS ON ONE OCCASION: LESS THAN MONTHLY
AUDIT-C TOTAL SCORE: 4

## 2024-10-03 ASSESSMENT — FIBROSIS 4 INDEX: FIB4 SCORE: 0.57

## 2024-10-07 ENCOUNTER — HOSPITAL ENCOUNTER (OUTPATIENT)
Dept: RADIOLOGY | Facility: MEDICAL CENTER | Age: 44
End: 2024-10-07
Attending: NURSE PRACTITIONER
Payer: COMMERCIAL

## 2024-10-07 DIAGNOSIS — R92.8 ABNORMALITY OF LEFT BREAST ON SCREENING MAMMOGRAM: ICD-10-CM

## 2024-10-07 PROCEDURE — 76642 ULTRASOUND BREAST LIMITED: CPT | Mod: LT

## 2024-10-07 PROCEDURE — G0279 TOMOSYNTHESIS, MAMMO: HCPCS

## 2024-10-19 RX ORDER — FAMOTIDINE 20 MG/1
TABLET, FILM COATED ORAL
Qty: 28 TABLET | Refills: 0 | OUTPATIENT
Start: 2024-10-19

## 2024-10-24 ENCOUNTER — APPOINTMENT (OUTPATIENT)
Dept: MEDICAL GROUP | Facility: PHYSICIAN GROUP | Age: 44
End: 2024-10-24
Payer: COMMERCIAL

## 2025-02-13 ENCOUNTER — HOSPITAL ENCOUNTER (OUTPATIENT)
Dept: LAB | Facility: MEDICAL CENTER | Age: 45
End: 2025-02-13
Attending: NURSE PRACTITIONER
Payer: COMMERCIAL

## 2025-02-13 DIAGNOSIS — D50.9 IRON DEFICIENCY ANEMIA, UNSPECIFIED IRON DEFICIENCY ANEMIA TYPE: ICD-10-CM

## 2025-02-13 DIAGNOSIS — R73.03 PREDIABETES: ICD-10-CM

## 2025-02-13 LAB
ERYTHROCYTE [DISTWIDTH] IN BLOOD BY AUTOMATED COUNT: 51 FL (ref 35.9–50)
EST. AVERAGE GLUCOSE BLD GHB EST-MCNC: 131 MG/DL
FERRITIN SERPL-MCNC: 33.4 NG/ML (ref 10–291)
HBA1C MFR BLD: 6.2 % (ref 4–5.6)
HCT VFR BLD AUTO: 39.4 % (ref 37–47)
HGB BLD-MCNC: 12.6 G/DL (ref 12–16)
IRON SATN MFR SERPL: 8 % (ref 15–55)
IRON SERPL-MCNC: 38 UG/DL (ref 40–170)
MCH RBC QN AUTO: 26.9 PG (ref 27–33)
MCHC RBC AUTO-ENTMCNC: 32 G/DL (ref 32.2–35.5)
MCV RBC AUTO: 84.2 FL (ref 81.4–97.8)
PLATELET # BLD AUTO: 305 K/UL (ref 164–446)
PMV BLD AUTO: 12.2 FL (ref 9–12.9)
RBC # BLD AUTO: 4.68 M/UL (ref 4.2–5.4)
TIBC SERPL-MCNC: 473 UG/DL (ref 250–450)
UIBC SERPL-MCNC: 435 UG/DL (ref 110–370)
WBC # BLD AUTO: 8.8 K/UL (ref 4.8–10.8)

## 2025-02-13 PROCEDURE — 83550 IRON BINDING TEST: CPT

## 2025-02-13 PROCEDURE — 36415 COLL VENOUS BLD VENIPUNCTURE: CPT

## 2025-02-13 PROCEDURE — 83036 HEMOGLOBIN GLYCOSYLATED A1C: CPT

## 2025-02-13 PROCEDURE — 82728 ASSAY OF FERRITIN: CPT

## 2025-02-13 PROCEDURE — 83540 ASSAY OF IRON: CPT

## 2025-02-13 PROCEDURE — 85027 COMPLETE CBC AUTOMATED: CPT

## 2025-02-18 ENCOUNTER — RESULTS FOLLOW-UP (OUTPATIENT)
Dept: MEDICAL GROUP | Facility: PHYSICIAN GROUP | Age: 45
End: 2025-02-18

## 2025-02-20 ENCOUNTER — OFFICE VISIT (OUTPATIENT)
Dept: MEDICAL GROUP | Facility: PHYSICIAN GROUP | Age: 45
End: 2025-02-20
Payer: COMMERCIAL

## 2025-02-20 VITALS
SYSTOLIC BLOOD PRESSURE: 136 MMHG | TEMPERATURE: 97.9 F | OXYGEN SATURATION: 95 % | DIASTOLIC BLOOD PRESSURE: 84 MMHG | HEART RATE: 94 BPM | HEIGHT: 62 IN | WEIGHT: 165 LBS | BODY MASS INDEX: 30.36 KG/M2

## 2025-02-20 DIAGNOSIS — I10 PRIMARY HYPERTENSION: ICD-10-CM

## 2025-02-20 DIAGNOSIS — R73.03 PREDIABETES: ICD-10-CM

## 2025-02-20 DIAGNOSIS — Z00.00 PREVENTATIVE HEALTH CARE: ICD-10-CM

## 2025-02-20 DIAGNOSIS — D50.9 IRON DEFICIENCY ANEMIA, UNSPECIFIED IRON DEFICIENCY ANEMIA TYPE: ICD-10-CM

## 2025-02-20 DIAGNOSIS — E78.5 HYPERLIPIDEMIA, UNSPECIFIED HYPERLIPIDEMIA TYPE: ICD-10-CM

## 2025-02-20 DIAGNOSIS — E55.9 VITAMIN D DEFICIENCY: ICD-10-CM

## 2025-02-20 DIAGNOSIS — Z71.2 ENCOUNTER TO DISCUSS TEST RESULTS: ICD-10-CM

## 2025-02-20 PROCEDURE — 99214 OFFICE O/P EST MOD 30 MIN: CPT | Performed by: NURSE PRACTITIONER

## 2025-02-20 PROCEDURE — 3075F SYST BP GE 130 - 139MM HG: CPT | Performed by: NURSE PRACTITIONER

## 2025-02-20 PROCEDURE — 3079F DIAST BP 80-89 MM HG: CPT | Performed by: NURSE PRACTITIONER

## 2025-02-20 ASSESSMENT — FIBROSIS 4 INDEX: FIB4 SCORE: 0.75

## 2025-02-20 ASSESSMENT — PATIENT HEALTH QUESTIONNAIRE - PHQ9: CLINICAL INTERPRETATION OF PHQ2 SCORE: 0

## 2025-02-20 NOTE — ASSESSMENT & PLAN NOTE
She has been taking daily OTC ferrous sulfate 45 mg elemental iron. She has constipation and green stools. BM are daily, soft and formed. She has increased her water intake.  Recent labs show improved anemia.  Iron remains low but has improved.  Ferritin has improved.  Plan to continue daily over-the-counter ferrous sulfate and repeat labs in 6 months.

## 2025-02-20 NOTE — ASSESSMENT & PLAN NOTE
Continues Rybelsus 3 mg daily.  She has not been watching her diet as much especially over the holidays.  She is getting some exercise but nothing regular.  Continue Rybelsus 3 mg daily and repeat A1c in 6 months.

## 2025-02-20 NOTE — PROGRESS NOTES
Subjective:     CC: lab results    HPI:   Bernice presents today with her daughter for the following:    Iron deficiency anemia  She has been taking daily OTC ferrous sulfate 45 mg elemental iron. She has constipation and green stools. BM are daily, soft and formed. She has increased her water intake.  Recent labs show improved anemia.  Iron remains low but has improved.  Ferritin has improved.  Plan to continue daily over-the-counter ferrous sulfate and repeat labs in 6 months.    Prediabetes  Continues Rybelsus 3 mg daily.  She has not been watching her diet as much especially over the holidays.  She is getting some exercise but nothing regular.  Continue Rybelsus 3 mg daily and repeat A1c in 6 months.        Past Medical History:   Diagnosis Date    Allergic rhinitis due to pollen 6/24/2015    Anemia 7/19/2023    Asthma     Diabetes (HCC)     gestational only    Hives 1/6/2015    Hyperlipidemia 2/11/2015    Hypertension 1/6/2015    Onychomycosis 6/24/2015       Social History     Tobacco Use    Smoking status: Some Days     Current packs/day: 0.10     Average packs/day: 0.1 packs/day for 25.0 years (2.5 ttl pk-yrs)     Types: Cigarettes    Smokeless tobacco: Never    Tobacco comments:     2-3 cigarettes daily, started age 12   Vaping Use    Vaping status: Never Used   Substance Use Topics    Alcohol use: Yes     Comment: 1-2 beers a couple times a week    Drug use: No       Current Outpatient Medications Ordered in Epic   Medication Sig Dispense Refill    Ferrous Sulfate Dried (FERROUS SULFATE IRON PO) Take 45 mg by mouth every day. 45 mg elemental iron      Semaglutide (RYBELSUS) 3 MG Tab Take 3 mg by mouth every day. 90 Tablet 3    fenofibrate (TRICOR) 48 MG Tab Take 1 Tablet by mouth every day. 90 Tablet 3    atorvastatin (LIPITOR) 20 MG Tab Take 1 Tablet by mouth every day. 90 Tablet 3    losartan-hydrochlorothiazide (HYZAAR) 50-12.5 MG per tablet TAKE 1 TABLET DAILY 90 Tablet 3    TRELEGY ELLIPTA 200-62.5-25  "MCG/INH AEROSOL POWDER, BREATH ACTIVATED Inhale 200 mcg every day.      albuterol 108 (90 Base) MCG/ACT Aero Soln inhalation aerosol Inhale 2 Puffs every four hours as needed. With spacer device 18.2 g 0    Multiple Vitamin (MULTI-VITAMIN PO) Take  by mouth every day.      loratadine (CLARITIN) 10 MG Tab Take 10 mg by mouth every day.       No current [facility]-ordered facility-administered medications on file.       Allergies:  Metformin and Shellfish allergy    Health Maintenance: Colonoscopy scheduled for March 2025.      Objective:     Vital signs reviewed  Exam:  /84 (BP Location: Left arm, Patient Position: Sitting, BP Cuff Size: Adult)   Pulse 94   Temp 36.6 °C (97.9 °F) (Temporal)   Ht 1.575 m (5' 2\")   Wt 74.8 kg (165 lb)   LMP 02/17/2025   SpO2 95%   BMI 30.18 kg/m²  Body mass index is 30.18 kg/m².    Gen: Alert and oriented, No apparent distress.  Lungs: Normal effort, CTA bilaterally, no wheezes, rhonchi, or rales  CV: Regular rate and rhythm. No murmurs, rubs, or gallops.  Ext: No clubbing, cyanosis, edema.      Assessment & Plan:     44 y.o. female with the following -     1. Prediabetes  Chronic exacerbated problem.  Continue Rybelsus 3 mg daily.  Continue to work on healthy diet and exercise.  A1c in 6 months around end of August/September 2025.  - HEMOGLOBIN A1C; Future    2. Iron deficiency anemia, unspecified iron deficiency anemia type  Chronic exacerbated problem.  Anemia improved and iron is improving.  Continue over-the-counter daily ferrous sulfate.  Plan for labs around August/September 2025.  - CBC WITHOUT DIFFERENTIAL; Future  - IRON/TOTAL IRON BIND; Future  - FERRITIN; Future    3. Hyperlipidemia, unspecified hyperlipidemia type  Chronic stable problem.  Continue atorvastatin 20 mg daily and fenofibrate 48 mg daily.  Plan for annual labs around August/September 2025.  - Lipid Profile; Future    4. Primary hypertension  Chronic stable problem.  BP controlled today.  Continue " losartan-hydrochlorothiazide 50-12.5 mg daily.  Plan for labs around August/September 2025  - Comp Metabolic Panel; Future  - TSH WITH REFLEX TO FT4; Future    5. Vitamin D deficiency  Chronic stable problem.  Continue daily multivitamin.  Plan for labs around August/September 2025.  - VITAMIN D,25 HYDROXY (DEFICIENCY); Future    6. Preventative health care  Acute uncomplicated problem.  Due for annual labs around end of August/September 2025.  - CBC WITHOUT DIFFERENTIAL; Future  - Comp Metabolic Panel; Future  - Lipid Profile; Future    7. Encounter to discuss test results  Acute uncomplicated problem.  Discussed and reviewed lab results from 2/13/2025.      Return in about 8 months (around 10/20/2025) for annual.    Please note that this dictation was created using voice recognition software. I have made every reasonable attempt to correct obvious errors, but I expect that there are errors of grammar and possibly content that I did not discover before finalizing the note.

## 2025-04-20 ENCOUNTER — PATIENT MESSAGE (OUTPATIENT)
Dept: MEDICAL GROUP | Facility: PHYSICIAN GROUP | Age: 45
End: 2025-04-20
Payer: COMMERCIAL

## 2025-04-20 DIAGNOSIS — R73.03 PREDIABETES: ICD-10-CM

## 2025-04-20 DIAGNOSIS — R73.01 IMPAIRED FASTING BLOOD SUGAR: Primary | ICD-10-CM

## 2025-04-20 DIAGNOSIS — Z83.3 FAMILY HISTORY OF DIABETES MELLITUS: ICD-10-CM

## 2025-04-20 DIAGNOSIS — E66.9 OBESITY (BMI 30-39.9): ICD-10-CM

## 2025-04-23 ENCOUNTER — TELEPHONE (OUTPATIENT)
Dept: MEDICAL GROUP | Facility: PHYSICIAN GROUP | Age: 45
End: 2025-04-23
Payer: COMMERCIAL

## 2025-04-24 NOTE — TELEPHONE ENCOUNTER
DOCUMENTATION OF PAR STATUS:    1. Name of Medication & Dose: Semaglutide (RYBELSUS) 3 MG Tab     2. Name of Prescription Coverage Company & phone #: [employer]    3. Date Prior Auth Submitted: 4/23/25    4. What information was given to obtain insurance decision? CHART NOTES    5. Prior Auth Status? Pending    6. Patient Notified: yes

## 2025-04-30 NOTE — TELEPHONE ENCOUNTER
FINAL PRIOR AUTHORIZATION STATUS:    1.  Name of Medication & Dose: rybelsus     2. Prior Auth Status: Denied.  Reason: only covered for type 2 diabetes     3. Action Taken: Pharmacy Notified: yes Patient Notified: yes

## 2025-05-15 RX ORDER — ORAL SEMAGLUTIDE 3 MG/1
3 TABLET ORAL DAILY
Qty: 90 TABLET | Refills: 3 | Status: SHIPPED | OUTPATIENT
Start: 2025-05-15

## 2025-05-16 NOTE — PROGRESS NOTES
Requested Prescriptions     Signed Prescriptions Disp Refills    Semaglutide (RYBELSUS) 3 MG Tab 90 Tablet 3     Sig: Take 3 mg by mouth every day.     Authorizing Provider: RONALDO SETHI     Additional ICD codes added to see if Rybelsus will be approved    MAYTE Bal.

## 2025-05-22 ENCOUNTER — PATIENT MESSAGE (OUTPATIENT)
Dept: MEDICAL GROUP | Facility: PHYSICIAN GROUP | Age: 45
End: 2025-05-22
Payer: COMMERCIAL

## 2025-05-22 ENCOUNTER — TELEPHONE (OUTPATIENT)
Dept: MEDICAL GROUP | Facility: PHYSICIAN GROUP | Age: 45
End: 2025-05-22
Payer: COMMERCIAL

## 2025-05-22 DIAGNOSIS — I10 ESSENTIAL HYPERTENSION: ICD-10-CM

## 2025-05-22 NOTE — TELEPHONE ENCOUNTER
DOCUMENTATION OF PAR STATUS:    1. Name of Medication & Dose: Rybelsus      2. Name of Prescription Coverage Company & phone #: [employer]    3. Date Prior Auth Submitted: 5/22/25    4. What information was given to obtain insurance decision? Chart notes    5. Prior Auth Status? Pending    6. Patient Notified: yes

## 2025-05-27 NOTE — TELEPHONE ENCOUNTER
FINAL PRIOR AUTHORIZATION STATUS:    1.  Name of Medication & Dose: Rybelsus     2. Prior Auth Status: Denied.  Reason: only covers for type 2 diabetes      3. Action Taken: Pharmacy Notified: yes Patient Notified: yes

## 2025-05-29 RX ORDER — LOSARTAN POTASSIUM AND HYDROCHLOROTHIAZIDE 12.5; 5 MG/1; MG/1
1 TABLET ORAL DAILY
Qty: 90 TABLET | Refills: 3 | Status: SHIPPED | OUTPATIENT
Start: 2025-05-29

## 2025-05-30 NOTE — PROGRESS NOTES
Requested Prescriptions     Signed Prescriptions Disp Refills    losartan-hydrochlorothiazide (HYZAAR) 50-12.5 MG per tablet 90 Tablet 3     Sig: Take 1 Tablet by mouth every day.     Authorizing Provider: RONALDO SETHI A.P.R.N.

## 2025-08-13 DIAGNOSIS — E78.5 HYPERLIPIDEMIA, UNSPECIFIED HYPERLIPIDEMIA TYPE: ICD-10-CM

## 2025-08-13 RX ORDER — ATORVASTATIN CALCIUM 20 MG/1
20 TABLET, FILM COATED ORAL DAILY
Qty: 90 TABLET | Refills: 3 | Status: SHIPPED | OUTPATIENT
Start: 2025-08-13

## (undated) DEVICE — TROCAR5X55 KII SHIELDED SYS - (6/BX)

## (undated) DEVICE — SUTURE GENERAL

## (undated) DEVICE — SYRINGE SAFETY 3 ML 18 GA X 1 1/2 BLUNT LL (100/BX 8BX/CA)

## (undated) DEVICE — SET SUCTION/IRRIGATION WITH DISPOSABLE TIP (6/CA )PART #0250-070-520 IS A SUB

## (undated) DEVICE — SYRINGE 30 ML LS (56/BX)

## (undated) DEVICE — TUBE E-T HI-LO CUFF 7.0MM (10EA/PK)

## (undated) DEVICE — CATHETER IV 20 GA X 1-1/4 ---SURG.& SDS ONLY--- (50EA/BX)

## (undated) DEVICE — GOWN SURGEONS X-LARGE - DISP. (30/CA)

## (undated) DEVICE — SUTURE 4-0 VICRYL PLUS FS-2 - 27 INCH (36/BX)

## (undated) DEVICE — SLEEVE, VASO, THIGH, MED

## (undated) DEVICE — BAG RETRIEVAL 10ML (10EA/BX)

## (undated) DEVICE — SENSOR SPO2 NEO LNCS ADHESIVE (20/BX) SEE USER NOTES

## (undated) DEVICE — LACTATED RINGERS INJ 1000 ML - (14EA/CA 60CA/PF)

## (undated) DEVICE — SODIUM CHL IRRIGATION 0.9% 1000ML (12EA/CA)

## (undated) DEVICE — TUBE CONNECTING SUCTION - CLEAR PLASTIC STERILE 72 IN (50EA/CA)

## (undated) DEVICE — MASK ANESTHESIA ADULT  - (100/CA)

## (undated) DEVICE — TUBING SETDISPOS HIGH FLOW II - (10/BX)

## (undated) DEVICE — TRAY SRGPRP PVP IOD WT PRP - (20/CA)

## (undated) DEVICE — TROCAR 5X100 BLADED Z-THREAD - KII (6/BX)

## (undated) DEVICE — CANISTER SUCTION 3000ML MECHANICAL FILTER AUTO SHUTOFF MEDI-VAC NONSTERILE LF DISP  (40EA/CA)

## (undated) DEVICE — SUTURE 0 VICRYL PLUS CT-2 - 27 INCH (36/BX)

## (undated) DEVICE — BANDAID SHEER STRIP 3/4 IN (100EA/BX 12BX/CA)

## (undated) DEVICE — CANISTER SUCTION RIGID RED 1500CC (40EA/CA)

## (undated) DEVICE — NEPTUNE 4 PORT MANIFOLD - (20/PK)

## (undated) DEVICE — ARMREST CRADLE FOAM - (2PR/PK 12PR/CA)

## (undated) DEVICE — KIT  I.V. START (100EA/CA)

## (undated) DEVICE — HEAD HOLDER JUNIOR/ADULT

## (undated) DEVICE — SUCTION INSTRUMENT YANKAUER BULBOUS TIP W/O VENT (50EA/CA)

## (undated) DEVICE — LIGASURE 5MM BLUNT TIP LONG - 44CM (6EA/PK)

## (undated) DEVICE — SET LEADWIRE 5 LEAD BEDSIDE DISPOSABLE ECG (1SET OF 5/EA)

## (undated) DEVICE — TRAY FOLEY CATHETER STATLOCK 16FR SURESTEP  (10EA/CA)

## (undated) DEVICE — GLOVE BIOGEL SZ 6.5 SURGICAL PF LTX (50PR/BX 4BX/CA)

## (undated) DEVICE — PACK LAPAROSCOPY - (1/CA)

## (undated) DEVICE — PROTECTOR ULNA NERVE - (36PR/CA)

## (undated) DEVICE — GOWN WARMING STANDARD FLEX - (30/CA)

## (undated) DEVICE — WATER IRRIGATION STERILE 1000ML (12EA/CA)

## (undated) DEVICE — NEEDLE INSFL 120MM 14GA VRRS - (20/BX)

## (undated) DEVICE — ELECTRODE 850 FOAM ADHESIVE - HYDROGEL RADIOTRNSPRNT (50/PK)

## (undated) DEVICE — TROCAR Z THREAD 11 X 100 - BLADED (6/BX)

## (undated) DEVICE — PAD SANITARY 11IN MAXI IND WRAPPED  (12EA/PK 24PK/CA)

## (undated) DEVICE — BANDAID X-LARGE 2 X 4 IN LF (50EA/BX)

## (undated) DEVICE — KIT ANESTHESIA W/CIRCUIT & 3/LT BAG W/FILTER (20EA/CA)

## (undated) DEVICE — TUBING CLEARLINK DUO-VENT - C-FLO (48EA/CA)